# Patient Record
Sex: MALE | Race: WHITE | NOT HISPANIC OR LATINO | Employment: OTHER | URBAN - METROPOLITAN AREA
[De-identification: names, ages, dates, MRNs, and addresses within clinical notes are randomized per-mention and may not be internally consistent; named-entity substitution may affect disease eponyms.]

---

## 2023-10-09 ENCOUNTER — APPOINTMENT (OUTPATIENT)
Dept: CARDIOLOGY | Facility: HOSPITAL | Age: 63
End: 2023-10-09
Payer: COMMERCIAL

## 2023-10-09 ENCOUNTER — APPOINTMENT (OUTPATIENT)
Dept: MRI IMAGING | Facility: HOSPITAL | Age: 63
End: 2023-10-09
Payer: COMMERCIAL

## 2023-10-09 ENCOUNTER — HOSPITAL ENCOUNTER (EMERGENCY)
Facility: HOSPITAL | Age: 63
Discharge: HOME | End: 2023-10-09
Attending: EMERGENCY MEDICINE | Admitting: EMERGENCY MEDICINE
Payer: COMMERCIAL

## 2023-10-09 VITALS
HEIGHT: 69 IN | BODY MASS INDEX: 29.62 KG/M2 | HEART RATE: 84 BPM | SYSTOLIC BLOOD PRESSURE: 164 MMHG | OXYGEN SATURATION: 96 % | RESPIRATION RATE: 18 BRPM | TEMPERATURE: 98 F | DIASTOLIC BLOOD PRESSURE: 96 MMHG | WEIGHT: 200 LBS

## 2023-10-09 DIAGNOSIS — R20.0 NUMBNESS AND TINGLING OF LEFT LEG: Primary | ICD-10-CM

## 2023-10-09 DIAGNOSIS — R20.2 NUMBNESS AND TINGLING OF LEFT LEG: Primary | ICD-10-CM

## 2023-10-09 LAB
BH CV LOWER VASCULAR LEFT COMMON FEMORAL AUGMENT: NORMAL
BH CV LOWER VASCULAR LEFT COMMON FEMORAL COMPRESS: NORMAL
BH CV LOWER VASCULAR LEFT COMMON FEMORAL PHASIC: NORMAL
BH CV LOWER VASCULAR LEFT COMMON FEMORAL SPONT: NORMAL
BH CV LOWER VASCULAR LEFT DISTAL FEMORAL COMPRESS: NORMAL
BH CV LOWER VASCULAR LEFT GASTRONEMIUS COMPRESS: NORMAL
BH CV LOWER VASCULAR LEFT GREATER SAPH AK COMPRESS: NORMAL
BH CV LOWER VASCULAR LEFT GREATER SAPH BK COMPRESS: NORMAL
BH CV LOWER VASCULAR LEFT LESSER SAPH COMPRESS: NORMAL
BH CV LOWER VASCULAR LEFT MID FEMORAL AUGMENT: NORMAL
BH CV LOWER VASCULAR LEFT MID FEMORAL COMPRESS: NORMAL
BH CV LOWER VASCULAR LEFT MID FEMORAL PHASIC: NORMAL
BH CV LOWER VASCULAR LEFT MID FEMORAL SPONT: NORMAL
BH CV LOWER VASCULAR LEFT PERONEAL COMPRESS: NORMAL
BH CV LOWER VASCULAR LEFT POPLITEAL AUGMENT: NORMAL
BH CV LOWER VASCULAR LEFT POPLITEAL COMPRESS: NORMAL
BH CV LOWER VASCULAR LEFT POPLITEAL PHASIC: NORMAL
BH CV LOWER VASCULAR LEFT POPLITEAL SPONT: NORMAL
BH CV LOWER VASCULAR LEFT POSTERIOR TIBIAL COMPRESS: NORMAL
BH CV LOWER VASCULAR LEFT PROFUNDA FEMORAL COMPRESS: NORMAL
BH CV LOWER VASCULAR LEFT PROXIMAL FEMORAL COMPRESS: NORMAL
BH CV LOWER VASCULAR LEFT SAPHENOFEMORAL JUNCTION COMPRESS: NORMAL
BH CV LOWER VASCULAR RIGHT COMMON FEMORAL AUGMENT: NORMAL
BH CV LOWER VASCULAR RIGHT COMMON FEMORAL COMPRESS: NORMAL
BH CV LOWER VASCULAR RIGHT COMMON FEMORAL PHASIC: NORMAL
BH CV LOWER VASCULAR RIGHT COMMON FEMORAL SPONT: NORMAL
BH CV VAS PRELIMINARY FINDINGS SCRIPTING: 1
BUN BLDA-MCNC: 15 MG/DL (ref 8–26)
CA-I BLDA-SCNC: 1.18 MMOL/L (ref 1.2–1.32)
CHLORIDE BLDA-SCNC: 104 MMOL/L (ref 98–109)
CO2 BLDA-SCNC: 22 MMOL/L (ref 24–29)
CREAT BLDA-MCNC: 1.2 MG/DL (ref 0.6–1.3)
EGFRCR SERPLBLD CKD-EPI 2021: 68.8 ML/MIN/1.73
GLUCOSE BLDC GLUCOMTR-MCNC: 100 MG/DL (ref 70–130)
HCT VFR BLDA CALC: 47 % (ref 38–51)
HGB BLDA-MCNC: 16 G/DL (ref 12–17)
POTASSIUM BLDA-SCNC: 3.7 MMOL/L (ref 3.5–4.9)
SODIUM BLD-SCNC: 141 MMOL/L (ref 138–146)

## 2023-10-09 PROCEDURE — 99284 EMERGENCY DEPT VISIT MOD MDM: CPT

## 2023-10-09 PROCEDURE — 93971 EXTREMITY STUDY: CPT | Performed by: INTERNAL MEDICINE

## 2023-10-09 PROCEDURE — 85014 HEMATOCRIT: CPT

## 2023-10-09 PROCEDURE — 70551 MRI BRAIN STEM W/O DYE: CPT

## 2023-10-09 PROCEDURE — 93971 EXTREMITY STUDY: CPT

## 2023-10-09 PROCEDURE — 80047 BASIC METABLC PNL IONIZED CA: CPT

## 2023-10-10 NOTE — ED PROVIDER NOTES
"Subjective   History of Present Illness  63-year-old male presents for evaluation of \"possible blood clot.\"  Of note, the patient tells me that he became symptomatic 4 days ago.  At that time he began experiencing numbness and tingling to his left upper extremity.  His symptoms seemed to improve spontaneously.  However, the following day he began experiencing numbness and tingling to his left lower extremity.  He went to his local emergency department where work-up, including head CT, was negative and he was discharged home with outpatient follow-up.  He was referred for an outpatient brain MRI.  He has been unable to get it thus far.  Today, he began experiencing a sensation of pain and swelling to his left calf and was advised to come to the emergency department to be evaluated.  The patient denies any fevers.  He denies any trauma or injury.  He denies any weakness to his left arm or left leg.  No changes to his speech pattern.  As a result of his ongoing symptoms he came here for evaluation.      Review of Systems   Musculoskeletal:         Left calf pain   Neurological:  Positive for numbness.   All other systems reviewed and are negative.      No past medical history on file.    No Known Allergies    No past surgical history on file.    No family history on file.    Social History     Socioeconomic History    Marital status: Single           Objective   Physical Exam  Vitals and nursing note reviewed.   Constitutional:       General: He is not in acute distress.     Appearance: He is well-developed. He is not diaphoretic.      Comments: Nontoxic-appearing male   HENT:      Head: Normocephalic and atraumatic.   Neck:      Vascular: No JVD.   Cardiovascular:      Rate and Rhythm: Normal rate and regular rhythm.      Heart sounds: Normal heart sounds. No murmur heard.     No friction rub. No gallop.   Pulmonary:      Effort: Pulmonary effort is normal. No respiratory distress.      Breath sounds: Normal breath " "sounds. No wheezing or rales.   Abdominal:      General: Bowel sounds are normal. There is no distension.      Palpations: Abdomen is soft. There is no mass.      Tenderness: There is no abdominal tenderness. There is no guarding.   Musculoskeletal:         General: Normal range of motion.      Cervical back: Normal range of motion.      Right lower leg: No edema.      Left lower leg: No edema.   Skin:     General: Skin is warm and dry.      Coloration: Skin is not pale.      Findings: No erythema or rash.      Comments: No warmth or erythema noted to left calf   Neurological:      Mental Status: He is alert and oriented to person, place, and time.      Comments: Awake, alert, and oriented x3, clear and fluent speech, no ataxia or dysmetria, normal gait, neurovascularly intact distally in all fours with bounding distal pulses and normal sensation, 5 out of 5 strength in all fours, no cranial nerve deficits noted with cranial nerves II through XII grossly intact   Psychiatric:         Thought Content: Thought content normal.         Judgment: Judgment normal.         Procedures           ED Course  ED Course as of 10/10/23 0208   Tue Oct 10, 2023   0205 63-year-old male sent to the emergency department to be evaluated for \"possible DVT of left calf\" and numbness and tingling to left arm and left leg.  On arrival to the ED, the patient is nontoxic-appearing.  Benign exam.  No left calf swelling or tenderness noted.  No exam or historical findings to suggest infectious etiology of the patient's symptoms.  He has no focal neurological deficits noted at this time from an objective standpoint.  However, given his reported symptoms, I do feel that neuroimaging is indicated to rule out emergent central process. [DD]   0206 Doppler ultrasound is negative for DVT.  Electrolytes are normal. [DD]   0206 I personally and independently reviewed the patient's MRI images and findings, and I am in agreement with the radiologist " regarding MRI interpretation--particularly, there is no emergent central process present. [DD]   0207 Patient reassured and counseled regarding symptomatic management of his symptoms.  I have referred him to Dr. Nation of neurology and he will follow-up as needed.  Agreeable with plan and given appropriate strict return precautions.  Doubt emergent central process at this time. [DD]   0207 No abdominal pain.  No chest pain.  No focal weakness.  No pulse deficits noted.  Clinical presentation does not appear to be consistent with aortic dissection. [DD]      ED Course User Index  [DD] Joss Higgins MD                                 Recent Results (from the past 24 hour(s))   Duplex Venous Lower Extremity - LEFT    Collection Time: 10/09/23  6:57 PM   Result Value Ref Range    Right Common Femoral Spont Y     Right Common Femoral Phasic Y     Right Common Femoral Compress C     Right Common Femoral Augment Y     Left Common Femoral Spont Y     Left Common Femoral Phasic Y     Left Common Femoral Compress C     Left Common Femoral Augment Y     Left Saphenofemoral Junction Compress C     Left Profunda Femoral Compress C     Left Proximal Femoral Compress C     Left Mid Femoral Spont Y     Left Mid Femoral Phasic Y     Left Mid Femoral Compress C     Left Mid Femoral Augment Y     Left Distal Femoral Compress C     Left Popliteal Spont Y     Left Popliteal Phasic Y     Left Popliteal Compress C     Left Popliteal Augment Y     Left Posterior Tibial Compress C     Left Peroneal Compress C     Left Gastronemius Compress C     Left Greater Saph AK Compress C     Left Greater Saph BK Compress C     Left Lesser Saph Compress C     BH CV VAS PRELIMINARY FINDINGS SCRIPTING 1.0    POC CHEM 8    Collection Time: 10/09/23  7:18 PM    Specimen: Blood   Result Value Ref Range    Glucose 100 70 - 130 mg/dL    BUN 15 8 - 26 mg/dL    Creatinine 1.20 0.60 - 1.30 mg/dL    Sodium 141 138 - 146 mmol/L    POC Potassium 3.7 3.5 - 4.9  "mmol/L    Chloride 104 98 - 109 mmol/L    Total CO2 22 (L) 24 - 29 mmol/L    Hemoglobin 16.0 12.0 - 17.0 g/dL    Hematocrit 47 38 - 51 %    Ionized Calcium 1.18 (L) 1.20 - 1.32 mmol/L    eGFR 68.8 >60.0 mL/min/1.73     Note: In addition to lab results from this visit, the labs listed above may include labs taken at another facility or during a different encounter within the last 24 hours. Please correlate lab times with ED admission and discharge times for further clarification of the services performed during this visit.    MRI Brain Without Contrast   Final Result   Impression:      1. Mild generalized parenchymal volume loss and changes of chronic small vessel ischemic disease.   2. No acute intracranial abnormality.            Electronically Signed: Ramiro Shepherd MD     10/9/2023 6:14 PM EDT     Workstation ID: [id number]        Vitals:    10/09/23 1715   BP: 164/96   BP Location: Left arm   Patient Position: Sitting   Pulse: 84   Resp: 18   Temp: 98 øF (36.7 øC)   TempSrc: Oral   SpO2: 96%   Weight: 90.7 kg (200 lb)   Height: 175.3 cm (69\")     Medications - No data to display  ECG/EMG Results (last 24 hours)       ** No results found for the last 24 hours. **          No orders to display                 Medical Decision Making  Problems Addressed:  Numbness and tingling of left leg: complicated acute illness or injury    Amount and/or Complexity of Data Reviewed  Labs: ordered.  Radiology: ordered.        Final diagnoses:   Numbness and tingling of left leg       ED Disposition  ED Disposition       ED Disposition   Discharge    Condition   Stable    Comment   --               Caroline Nation MD  [address] [address] [phone]    In 1 week           Medication List      No changes were made to your prescriptions during this visit.            Joss Higgins MD  10/10/23 0208    "

## 2023-10-13 ENCOUNTER — TELEPHONE (OUTPATIENT)
Dept: NEUROLOGY | Facility: CLINIC | Age: 63
End: 2023-10-13
Payer: COMMERCIAL

## 2023-10-13 NOTE — TELEPHONE ENCOUNTER
Caller: José Raza    Relationship to patient: Self    Best call back number: [phone]     New or established patient?  [x] New  [] Established    Date of discharge: 10/9/23    Facility discharged from:     Diagnosis/Symptoms: NUMBNESS & TINGLING OF LEFT LEG     Length of stay (If applicable): 1      Specialty Only: Did you see a [facility] provider?    [] Yes  [x] No  If so, who? N/A      PATIENT TELEPHONED TO REQUEST [facility] HOSP F/U W/ [facility] FOR DX:NUMBNESS & TINGLING OF LEFT LEG     NO PREV. NEURO AT THIS TIME    OK TO SCHEDULE? IF SO, WITH WHICH PROVIDER? PLEASE REVIEW & ADVISE-THANK YOU

## 2023-10-17 ENCOUNTER — OFFICE VISIT (OUTPATIENT)
Dept: NEUROLOGY | Facility: CLINIC | Age: 63
End: 2023-10-17
Payer: COMMERCIAL

## 2023-10-17 ENCOUNTER — LAB (OUTPATIENT)
Dept: LAB | Facility: HOSPITAL | Age: 63
End: 2023-10-17
Payer: COMMERCIAL

## 2023-10-17 VITALS
HEART RATE: 73 BPM | OXYGEN SATURATION: 96 % | DIASTOLIC BLOOD PRESSURE: 78 MMHG | BODY MASS INDEX: 29.62 KG/M2 | SYSTOLIC BLOOD PRESSURE: 140 MMHG | HEIGHT: 69 IN | WEIGHT: 200 LBS

## 2023-10-17 DIAGNOSIS — R20.2 LEFT LEG PARESTHESIAS: Primary | ICD-10-CM

## 2023-10-17 DIAGNOSIS — R20.2 LEFT LEG PARESTHESIAS: ICD-10-CM

## 2023-10-17 DIAGNOSIS — R20.2 PARESTHESIAS IN LEFT HAND: ICD-10-CM

## 2023-10-17 DIAGNOSIS — Z87.898 HISTORY OF PREDIABETES: ICD-10-CM

## 2023-10-17 PROCEDURE — 82607 VITAMIN B-12: CPT

## 2023-10-17 PROCEDURE — 36415 COLL VENOUS BLD VENIPUNCTURE: CPT

## 2023-10-17 PROCEDURE — 83036 HEMOGLOBIN GLYCOSYLATED A1C: CPT

## 2023-10-17 PROCEDURE — 82746 ASSAY OF FOLIC ACID SERUM: CPT

## 2023-10-17 PROCEDURE — 83921 ORGANIC ACID SINGLE QUANT: CPT

## 2023-10-17 RX ORDER — ATORVASTATIN CALCIUM 10 MG/1
TABLET, FILM COATED ORAL DAILY
COMMUNITY

## 2023-10-17 RX ORDER — OMEPRAZOLE 20 MG/1
20 CAPSULE, DELAYED RELEASE ORAL DAILY
COMMUNITY

## 2023-10-17 NOTE — PROGRESS NOTES
Neuro Office Visit      Encounter Date: 10/17/2023   Patient Name: José Raza  : 1960   MRN: [id number]   PCP:  Wesley Mayfield MD     Chief Complaint:    Chief Complaint   Patient presents with    Numbness       History of Present Illness: José Raza is a 63 y.o. male who is here today in Neurology for ED follow up.    PMH of ocular migraines (looking through running water), cardiac ablation for SVT over 10 years ago, HLD, occasional acid reflux     Patient was seen at [facility] ED on 10/9/2023 for evaluation of possible blood clot.  He reported at that time that he became symptomatic 4 days prior.  At that time he began experiencing numbness and tingling to his left upper extremity.  Symptoms seem to improve spontaneously.  However the following day he began experiencing numbness and tingling to his left lower extremity.  He went to his local emergency department where work-up including head CT was negative and he was discharged home with outpatient follow-up.  He was referred for an outpatient brain MRI which had not been completed yet.  On 10/9/2023 he began experiencing a sensation of pain and swelling to his left calf and was advised to come the emergency department to be evaluated.  He denied any weakness of left arm or left leg.  No changes to speech pattern.  While in the ED he he underwent Doppler ultrasound which was negative for DVT.  He also underwent MRI brain without contrast which showed mild generalized parenchymal volume loss and changes of chronic small vessel ischemic disease, no acute intracranial abnormality.  He was discharged from ED in stable condition with plan to be evaluated by neurology in 1 week.    In clinic today he reports left arm and left leg numbness. He reports that he does go to the chiropractor for chronic neck pain. The pain went away in his left calf, but the numbness has persisted in his left leg from the waist down to his toes, he feels  "like he is walking on a foreign object. No urinary or bowel incontinence, no saddle anesthesia. He reports that his left arm has a slight bit of numbness in his fingertips and that he does have history of numbness in bilateral arms previously. Denies any persistent low back pain. No weakness or arms or legs. No history of diabetes or heavy alcohol use.      Subjective      Review of Systems   Constitutional: Negative.    HENT: Negative.     Respiratory: Negative.     Cardiovascular: Negative.    Gastrointestinal: Negative.    Endocrine: Negative.    Genitourinary: Negative.    Musculoskeletal: Negative.    Skin: Negative.    Allergic/Immunologic: Negative.    Neurological:  Positive for numbness.   Hematological: Negative.    Psychiatric/Behavioral: Negative.            Past Medical History: History reviewed. No pertinent past medical history.    Past Surgical History: History reviewed. No pertinent surgical history.    Family History: History reviewed. No pertinent family history.    Social History:   Social History     Socioeconomic History    Marital status: Single   Tobacco Use    Smoking status: Never     Passive exposure: Never    Smokeless tobacco: Never   Vaping Use    Vaping Use: Never used       Medications:     Current Outpatient Medications:     atorvastatin (LIPITOR) 5 MG half tablet, Take  by mouth Daily. Unsure of mg, Disp: , Rfl:     omeprazole (priLOSEC) 20 MG capsule, Take 1 capsule by mouth Daily., Disp: , Rfl:     Allergies:   No Known Allergies      STEADI Fall Risk Assessment has not been completed.    Objective     Objective:    /78   Pulse 73   Ht 175.3 cm (69\")   Wt 90.7 kg (200 lb)   SpO2 96%   BMI 29.53 kg/m²   Body mass index is 29.53 kg/m².    Physical Exam  Vitals reviewed.   Constitutional:       Appearance: Normal appearance.   HENT:      Head: Normocephalic and atraumatic.      Mouth/Throat:      Mouth: Mucous membranes are moist.      Pharynx: Oropharynx is clear. "   Pulmonary:      Effort: Pulmonary effort is normal. No respiratory distress.   Musculoskeletal:      Right lower leg: No edema.      Left lower leg: No edema.   Skin:     General: Skin is warm and dry.   Neurological:      Mental Status: He is alert.          Neurology Exam:    General apperance: NAD.     Mental status: Alert, awake and oriented to time place and person.    Fund of knowledge:  Normal.     Language and Speech: No aphasia or dysarthria.    Naming , Repitition and Comprehension:  Can name objects, repeat a sentence and follow commands. Speech is clear and fluent with good repetition, comprehension, and naming.    Cranial Nerves:   CN II: Visual fields are full. Intact. Pupils - PERRLA  CN III, IV and VI: Extraocular movements are intact. Normal saccades.   CN V: Facial sensation is intact.   CN VII: Muscles of facial expression reveal no asymmetry. Intact.   CN VIII: Hearing is intact.   CN IX and X: Palate elevates symmetrically. Intact  CN XI: Shoulder shrug is intact.   CN XII: Tongue is midline without evidence of atrophy or fasciculation.     Motor:  Right UE muscle strength 5/5. Normal tone.     Left UE muscle strength 5/5. Normal tone.      Right LE muscle strength 5/5. Normal tone.     Left LE muscle strength 5/5. Normal tone.      Sensory: Decreased sensation left hand fingertips and entire left leg from hip to feet. Vibratory sense intact. Proprioception intact.     DTRs: 2+ bilaterally in upper and lower extremities.    Coordination: Normal finger-to-nose, heel to shin B/L.    Rhomberg: Negative.    Gait: Normal.        Results:   Imaging:   MRI Brain Without Contrast    Result Date: 10/9/2023  Impression: 1. Mild generalized parenchymal volume loss and changes of chronic small vessel ischemic disease. 2. No acute intracranial abnormality. Electronically Signed: Ramiro Shepherd MD  10/9/2023 6:14 PM EDT  Workstation ID: [id number]       Labs:       Assessment / Plan      Assessment/Plan:    Diagnoses and all orders for this visit:    1. Left leg paresthesias (Primary)  -     Hemoglobin A1c; Future  -     Vitamin B12 & Folate; Future  -     Methylmalonic Acid, Serum; Future  -     EMG & Nerve Conduction Test; Future  -     Cancel: MRI Lumbar Spine Without Contrast; Future  -     MRI Lumbar Spine Without Contrast; Future    2. Paresthesias in left hand  -     Hemoglobin A1c; Future  -     Vitamin B12 & Folate; Future  -     Methylmalonic Acid, Serum; Future  -     EMG & Nerve Conduction Test; Future    3. History of prediabetes  -     Hemoglobin A1c; Future  -     EMG & Nerve Conduction Test; Future       José Raza is in neurology clinic to establish care for left hand and left leg paresthesias. He reports that his most bothersome is the sudden onset of left leg numbness. MRI brain performed in ED was without acute intracranial abnormality. I have reviewed his case with Dr. Shaw today and we have elected to pursue EMG and lab work. I have also ordered urgent MRI lumbar spine as he reports that his whole left leg is numb and this came on quite suddenly - no loss of bowel or bladder control or weakness however given the acute nature of his sensory change I would recommend pursuing MRI lumbar spine.     Addendum 10/20/2023:  Dermatomal involvement includes L2, L3, L4, L5, S1 of left leg to light touch    Patient Education:       Reviewed medications, potential side effects and signs and symptoms to report. Discussed risk versus benefits of treatment plan with patient and/or family-including medications, labs and radiology that may be ordered. Addressed questions and concerns during visit. Patient and/or family verbalized understanding and agree with plan. Instructed to call the office with any questions and report to ER with any life-threatening symptoms.     Follow Up:   Return in about 4 weeks (around 11/14/2023).    I spent 30  minutes face to face with the patient. I personally spent 50 percent  of this time counseling and discussing diagnostic testing, evaluation, treatment options, and management .       During this visit the following were done:  Labs Reviewed []    Labs Ordered [x]    Radiology Reports Reviewed [x]    Radiology Ordered [x]    PCP Records Reviewed []    Referring Provider Records Reviewed []    ER Records Reviewed []    Hospital Records Reviewed [x]    History Obtained From Family []    Radiology Images Reviewed [x]    Other Reviewed []    Records Requested []      MOMO Rollins  [facility]  [address] [address] [phone]

## 2023-10-18 LAB
FOLATE SERPL-MCNC: 12.5 NG/ML (ref 4.78–24.2)
HBA1C MFR BLD: 6.2 % (ref 4.8–5.6)
VIT B12 BLD-MCNC: 338 PG/ML (ref 211–946)

## 2023-10-19 ENCOUNTER — TELEPHONE (OUTPATIENT)
Dept: NEUROLOGY | Facility: CLINIC | Age: 63
End: 2023-10-19
Payer: COMMERCIAL

## 2023-10-19 NOTE — TELEPHONE ENCOUNTER
Notified patient of results and new medication.  Stated he was currently taking b complex vitamin which I told him to stop that for now per Leyla and just do the b12 injections.    He stated that he has been taking zinc 50mg daily for awhile now (he opted out of covid vaccines and has been taking supplements instead) and became aware that a long term side effect of too much zinc is copper deficiency which may cause numbness.  He wanted Leyla to know in case she wanted to get his zinc checked.    Notified of MRI and he stated he has high deductible and since it's close to end of year that if we may expedite his test before end of year he would appreciate it.

## 2023-10-19 NOTE — TELEPHONE ENCOUNTER
----- Message from MOMO Rollins sent at 10/18/2023  4:22 PM EDT -----  Please notify José that his hemoglobin A1C level was slightly elevated at 6.20 which indicates prediabetes, I would recommend eating a heart healthy diet and minimizing intake of concentrated sweets. His vitamin b-12 level was 338 which is on the low end of normal for which I am going to send B12 injections to his pharmacy, he is to do 2 injections in the first week (about every 3 days), then weekly for a month, and then monthly for 6 months. If he is not comfortable doing the injections he can ask that they are done at the pharmacy. I have also ordered MRI lumbar spine to check for any spinal cord involvement/nerve impingement that may be causing his numbness

## 2023-10-20 ENCOUNTER — TELEPHONE (OUTPATIENT)
Dept: NEUROLOGY | Facility: CLINIC | Age: 63
End: 2023-10-20
Payer: COMMERCIAL

## 2023-10-20 DIAGNOSIS — R20.2 LEFT LEG PARESTHESIAS: Primary | ICD-10-CM

## 2023-10-20 NOTE — TELEPHONE ENCOUNTER
Provider: TOBY ARRIAGA APRN    Caller: VANESSA    Relationship to Patient: SELF    Phone Number: [phone]    Reason for Call: PT CALLED TO LET PROVIDER KNOW HE WENT TO [facility] AND GOT HIS B 12 MEDICATION AND NO SYRINGES.   [facility] TOLD HIM THEY NEED A PRESCRIPTION FOR THE SYRINGES.   [facility] DOES NOT DO THE INJECTIONS.  PT WANTS TO KNOW WHO IS GOING TO SHOW HIM HOW TO DO THE INJECTIONS.    When was the patient last seen: 10-17-23    PLEASE CALL & ADVISE

## 2023-10-20 NOTE — TELEPHONE ENCOUNTER
Notified patient of script being sent in.  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  I informed him to make sure that he uses a separate needle/syringe with each injection, to clean with alcohol swab prior to injection, to make sure to draw up the correct amount,  and to follow the directions exactly.  He stated that he was in good understanding.

## 2023-10-20 NOTE — TELEPHONE ENCOUNTER
Spoke to pharmacy and they stated that they do not have the kits (they just gave him the vials) and will need script for the syringes.  They advised that he buy them online as typically insurance does not cover syringes and it will be cheaper.

## 2023-10-20 NOTE — TELEPHONE ENCOUNTER
Patient called stating that he still needs the syringe and needles for his injections. He called his PCP about it and they said they could order them for him but he is afraid they will take too long in getting them ordered.  I told him I would ask Leyla if she can call him in a script for it.

## 2023-10-20 NOTE — TELEPHONE ENCOUNTER
Spoke w/Jayme and she stated that he may walk-in from 9-11 & 2-4 M-F for them to show him how to do the injection or they can give it to him.    Notified José that his PCP can give injections or show him how to do it.  He was in good understanding.

## 2023-10-20 NOTE — TELEPHONE ENCOUNTER
----- Message from MOMO Rollins sent at 10/20/2023  3:26 PM EDT -----  I have placed order for needles and syringes to be dispensed by pharmacy, please ask that these injections are done by primary care office with use of an alcohol swab prior to injection. Please let me know if he has any additional questions or concerns, thank you!

## 2023-10-22 LAB — METHYLMALONATE SERPL-SCNC: 178 NMOL/L (ref 0–378)

## 2023-10-23 ENCOUNTER — TELEPHONE (OUTPATIENT)
Dept: NEUROLOGY | Facility: CLINIC | Age: 63
End: 2023-10-23
Payer: COMMERCIAL

## 2023-10-23 NOTE — TELEPHONE ENCOUNTER
----- Message from MOMO Rollins sent at 10/23/2023 11:06 AM EDT -----  Methylmalonic acid level is normal.

## 2023-10-23 NOTE — TELEPHONE ENCOUNTER
"No answer.    Relay     \"Per Leyla Overton, your methylmalonic acid level is normal.\"                "

## 2023-10-24 ENCOUNTER — HOSPITAL ENCOUNTER (OUTPATIENT)
Dept: MRI IMAGING | Facility: HOSPITAL | Age: 63
Discharge: HOME | End: 2023-10-24
Payer: COMMERCIAL

## 2023-10-24 DIAGNOSIS — R20.2 LEFT LEG PARESTHESIAS: ICD-10-CM

## 2023-10-24 PROCEDURE — 72148 MRI LUMBAR SPINE W/O DYE: CPT

## 2023-10-25 ENCOUNTER — TELEPHONE (OUTPATIENT)
Dept: NEUROLOGY | Facility: CLINIC | Age: 63
End: 2023-10-25
Payer: COMMERCIAL

## 2023-10-25 DIAGNOSIS — M51.26 PROTRUSION OF LUMBAR INTERVERTEBRAL DISC: Primary | ICD-10-CM

## 2023-10-25 NOTE — TELEPHONE ENCOUNTER
Notified patient of results.  He is interested in PT in [place].    Also, he wanted to know if that is where the numbness is coming from?

## 2023-10-25 NOTE — TELEPHONE ENCOUNTER
----- Message from MOMO Rollins sent at 10/24/2023  4:55 PM EDT -----  Please notify José that his MRI lumbar spine shows Only finding I see is a central disc protrusion at L2-L3 without any evidence of neuroforaminal stenosis at this level, there are no severe areas of narrowing, Dr. Shaw recommended trying PT, would be interested in PT? If yes I can place order, thanks.

## 2023-10-26 NOTE — TELEPHONE ENCOUNTER
Notified patient and let him know that he can discuss more in detail with Leyla at his OV 11/17/23 @8:30.  He was in good understanding.

## 2023-11-07 ENCOUNTER — HOSPITAL ENCOUNTER (OUTPATIENT)
Dept: NEUROLOGY | Facility: HOSPITAL | Age: 63
Discharge: HOME | End: 2023-11-07
Payer: COMMERCIAL

## 2023-11-07 DIAGNOSIS — Z87.898 HISTORY OF PREDIABETES: ICD-10-CM

## 2023-11-07 DIAGNOSIS — R20.2 PARESTHESIAS IN LEFT HAND: ICD-10-CM

## 2023-11-07 DIAGNOSIS — R20.2 LEFT LEG PARESTHESIAS: ICD-10-CM

## 2023-11-07 PROCEDURE — 95912 NRV CNDJ TEST 11-12 STUDIES: CPT

## 2023-11-07 PROCEDURE — 95886 MUSC TEST DONE W/N TEST COMP: CPT

## 2023-11-08 ENCOUNTER — TELEPHONE (OUTPATIENT)
Dept: NEUROLOGY | Facility: CLINIC | Age: 63
End: 2023-11-08
Payer: COMMERCIAL

## 2023-11-08 DIAGNOSIS — M51.26 PROTRUSION OF LUMBAR INTERVERTEBRAL DISC: ICD-10-CM

## 2023-11-08 DIAGNOSIS — R20.2 LEFT LEG PARESTHESIAS: Primary | ICD-10-CM

## 2023-11-08 NOTE — TELEPHONE ENCOUNTER
----- Message from MOMO Rollins sent at 11/7/2023  3:19 PM EST -----  Please notify José that his EMG shows mild-moderate ulnar neuropathy of his left elbow - I would recommend wearing soft elbow brace at night to minimize ulnar nerve compression. Otherwise EMG was normal. Please let me know if he has any questions or concerns, thank you!

## 2023-11-08 NOTE — TELEPHONE ENCOUNTER
Notified patient of results.  His main concern is the numbness in his leg.  He stated that it is pretty severe and starts from his hip and goes down to his feet and toes.  Does the EMG show anything regarding that?  Please advise.

## 2023-11-09 NOTE — TELEPHONE ENCOUNTER
Called José with Leyla's response and he states no, he has not seen improvement with the B12 injections. He actually feels the numbness in his left leg and foot have gotten worse since seeing you and after the EMG.

## 2023-11-13 RX ORDER — PREGABALIN 50 MG/1
50 CAPSULE ORAL NIGHTLY
Qty: 30 CAPSULE | Refills: 2 | Status: SHIPPED | OUTPATIENT
Start: 2023-11-13 | End: 2024-02-11

## 2023-11-13 NOTE — TELEPHONE ENCOUNTER
Notified patient that script has been sent in and that it may cause sleepiness and he was in good understanding.

## 2023-11-13 NOTE — TELEPHONE ENCOUNTER
Spoke with José and he is willing to try Lyrica.   I informed him that at his next appointment we will have him sign a CSA.  I briefly went over what the agreement entails (that he will not abuse medication, he will take it as prescribed).  He would like it sent to [facility] pharmacy in [place].

## 2023-11-13 NOTE — TELEPHONE ENCOUNTER
Thank you, CLARA reviewed. I have sent Lyrica 50 mg nightly, this medication can cause some sleepiness. I also recommend not taking with any alcohol or other sedating medications. Please let me know if he has any questions or concerns prior to his next visit, thanks.

## 2023-11-17 ENCOUNTER — OFFICE VISIT (OUTPATIENT)
Dept: NEUROLOGY | Facility: CLINIC | Age: 63
End: 2023-11-17
Payer: COMMERCIAL

## 2023-11-17 VITALS
DIASTOLIC BLOOD PRESSURE: 84 MMHG | OXYGEN SATURATION: 95 % | SYSTOLIC BLOOD PRESSURE: 140 MMHG | HEART RATE: 67 BPM | HEIGHT: 69 IN | WEIGHT: 200 LBS | BODY MASS INDEX: 29.62 KG/M2

## 2023-11-17 DIAGNOSIS — R20.2 LEFT LEG PARESTHESIAS: Primary | ICD-10-CM

## 2023-11-17 DIAGNOSIS — G56.22 ULNAR NEUROPATHY AT ELBOW, LEFT: ICD-10-CM

## 2023-11-17 DIAGNOSIS — R90.82 WHITE MATTER ABNORMALITY ON MRI OF BRAIN: ICD-10-CM

## 2023-11-17 DIAGNOSIS — M51.26 PROTRUSION OF LUMBAR INTERVERTEBRAL DISC: ICD-10-CM

## 2023-11-17 NOTE — PROGRESS NOTES
Neuro Office Visit      Encounter Date: 2023   Patient Name: José Raza  : 1960   MRN: [id number]   PCP:  Wesley Mayfield MD     Chief Complaint:    Chief Complaint   Patient presents with    Numbness     Left leg       History of Present Illness: José Raza is a 63 y.o. male who is here today in Neurology for  left leg numbness    Initial visit 10/17/2023:  José Raza is a 63 y.o. male who is here today in Neurology for ED follow up.     PMH of ocular migraines (looking through running water), cardiac ablation for SVT over 10 years ago, HLD, occasional acid reflux      Patient was seen at [facility] ED on 10/9/2023 for evaluation of possible blood clot.  He reported at that time that he became symptomatic 4 days prior.  At that time he began experiencing numbness and tingling to his left upper extremity.  Symptoms seem to improve spontaneously.  However the following day he began experiencing numbness and tingling to his left lower extremity.  He went to his local emergency department where work-up including head CT was negative and he was discharged home with outpatient follow-up.  He was referred for an outpatient brain MRI which had not been completed yet.  On 10/9/2023 he began experiencing a sensation of pain and swelling to his left calf and was advised to come the emergency department to be evaluated.  He denied any weakness of left arm or left leg.  No changes to speech pattern.  While in the ED he he underwent Doppler ultrasound which was negative for DVT.  He also underwent MRI brain without contrast which showed mild generalized parenchymal volume loss and changes of chronic small vessel ischemic disease, no acute intracranial abnormality.  He was discharged from ED in stable condition with plan to be evaluated by neurology in 1 week.     In clinic today he reports left arm and left leg numbness. He reports that he does go to the chiropractor for chronic  neck pain. The pain went away in his left calf, but the numbness has persisted in his left leg from the waist down to his toes, he feels like he is walking on a foreign object. No urinary or bowel incontinence, no saddle anesthesia. He reports that his left arm has a slight bit of numbness in his fingertips and that he does have history of numbness in bilateral arms previously. Denies any persistent low back pain. No weakness or arms or legs. No history of diabetes or heavy alcohol use.      Current visit 11/17/2023:  José Raza returns to neurology clinic for continued evaluation of left leg paresthesias. MRI brain performed on 10/9/2023 negative for any acute changes, there is mild generalized parenchymal volume loss. There are scattered foci of increased T2 signal within the subcortical and periventricular white matter bilaterally. MRI lumbar spine performed on 10/24/2023 showed central posterior disc extrusion at the L2/3 level with minimal central and lateral recess narrowing. Mild to moderate foraminal stenosis at this level. Mild multifactorial degenerative changes of the lumbar spine otherwise.This was reviewed with Dr. Shaw as well. 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 Hemoglobin A1c 6.20 - prediabetes, vitamin b12 was 338 for which he was started on B12 injections, methylmalonic acid was 178. His case was reviewed with Dr. Shaw and he was started on Lyrica 50 mg daily.     He notes continued numbness that ebbs and flows in his left leg, in his toes the numbness can sometimes shift between toes, whole leg continues to feel numb from hip down to toes. He notes tingling when he walks. He also feels like there is pressure in his leg. He does feel that the left leg is heavier than the right.  "He has been taking Lyrica 50 mg nightly without much change - does feel that it has helped some with the aching. No true left leg weakness but he does feel that it is heavier. He denies any next day grogginess from the Lyrica. He also notes intermittent left arm numbness which is much less bothersome/persistent than his left leg numbness.     For the last 3 years he has taken Zinc 50 mg daily.    Subjective      Review of Systems   Constitutional: Negative.    HENT: Negative.     Eyes: Negative.    Respiratory: Negative.     Cardiovascular: Negative.    Gastrointestinal: Negative.    Endocrine: Negative.    Genitourinary: Negative.    Musculoskeletal:  Positive for neck pain and neck stiffness.   Skin: Negative.    Allergic/Immunologic: Negative.    Neurological:  Positive for numbness.   Hematological: Negative.    Psychiatric/Behavioral: Negative.            Past Medical History: No past medical history on file.    Past Surgical History: No past surgical history on file.    Family History: No family history on file.    Social History:   Social History     Socioeconomic History    Marital status: Single   Tobacco Use    Smoking status: Never     Passive exposure: Never    Smokeless tobacco: Never   Vaping Use    Vaping Use: Never used       Medications:     Current Outpatient Medications:     atorvastatin (LIPITOR) 5 MG half tablet, Take  by mouth Daily. Unsure of mg, Disp: , Rfl:     Cyanocobalamin 1000 MCG/ML kit, Inject 1 mL as directed Every 3 (Three) Days for 7 days, THEN 1 mL 1 (One) Time Per Week for 28 days, THEN 1 mL Every 30 (Thirty) Days for 180 days., Disp: 12 kit, Rfl: 0    Needle, Disp, 23G X 1\" misc, To be used for B12 injections - B12 injections to be done by primary care office. Please also dispense 12 of the 1 mL syringes., Disp: 12 each, Rfl: 0    omeprazole (priLOSEC) 20 MG capsule, Take 1 capsule by mouth Daily., Disp: , Rfl:     pregabalin (Lyrica) 50 MG capsule, Take 1 capsule by mouth Every " "Night for 90 days., Disp: 30 capsule, Rfl: 2    Allergies:   No Known Allergies      Objective     Objective:    /84   Pulse 67   Ht 175.3 cm (69\")   Wt 90.7 kg (200 lb)   SpO2 95%   BMI 29.53 kg/m²   Body mass index is 29.53 kg/m².    Physical Exam  Vitals reviewed.   Constitutional:       Appearance: Normal appearance.   HENT:      Head: Normocephalic and atraumatic.      Mouth/Throat:      Mouth: Mucous membranes are moist.      Pharynx: Oropharynx is clear.   Pulmonary:      Effort: Pulmonary effort is normal. No respiratory distress.   Musculoskeletal:      Right lower leg: No edema.      Left lower leg: No edema.   Skin:     General: Skin is warm and dry.   Neurological:      Mental Status: He is alert.          Neurology Exam:    General apperance: NAD.     Mental status: Alert, awake and oriented to time place and person.    Fund of knowledge:  Normal.     Language and Speech: No aphasia or dysarthria.    Naming , Repitition and Comprehension:  Can name objects, repeat a sentence and follow commands. Speech is clear and fluent with good repetition, comprehension, and naming.    Cranial Nerves:   CN II: Visual fields are full. Intact. Pupils - PERRLA  CN III, IV and VI: Extraocular movements are intact. Normal saccades.   CN V: Facial sensation is intact.   CN VII: Muscles of facial expression reveal no asymmetry. Intact.   CN VIII: Hearing is intact.   CN IX and X: Palate elevates symmetrically. Intact  CN XI: Shoulder shrug is intact.   CN XII: Tongue is midline without evidence of atrophy or fasciculation.     Motor:  Right UE muscle strength 5/5. Normal tone.     Left UE muscle strength 5/5. Normal tone.      Right LE muscle strength 5/5. Normal tone.     Left LE muscle strength 5/5. Normal tone.      Sensory: Decreased sensation to light touch left leg from hip down to foot.    DTRs: 2+ bilaterally in upper and lower extremities.    Babinski: Negative bilaterally.    Coordination: Normal " finger-to-nose, heel to reza B/L.    Rhomberg: Negative.    Gait: Normal. No assistive device.    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     MRI Lumbar Spine Without Contrast    Result Date: 10/24/2023  Impression: 1.Central posterior disc extrusion at the L2/3 level with minimal central and lateral recess narrowing. Mild to moderate foraminal stenosis at this level. 2.Mild multifactorial degenerative changes of the lumbar spine otherwise. Electronically Signed: Alejandro Thomas MD  10/24/2023 9:05 AM CDT  Workstation ID: [id number]    MRI Brain Without Contrast    Result Date: 10/9/2023  Impression: 1. Mild generalized parenchymal volume loss and changes of chronic small vessel ischemic disease. 2. No acute intracranial abnormality. Electronically Signed: Ramiro Shepherd MD  10/9/2023 6:14 PM EDT  Workstation ID: [id number]       Labs:   Lab Results   Component Value Date    CREATININE 1.20 10/09/2023    EGFR 68.8 10/09/2023     Hemoglobin   Date Value Ref Range Status   10/09/2023 16.0 12.0 - 17.0 g/dL Final     Comment:     Serial Number: [id number]:  [id number]     Hematocrit   Date Value Ref Range Status   10/09/2023 47 38 - 51 % Final         Assessment / Plan      Assessment/Plan:   Diagnoses and all orders for this visit:    1. Left leg paresthesias (Primary)    2. Protrusion of lumbar intervertebral disc    3. White matter abnormality on MRI of brain    4. Ulnar neuropathy at elbow, left       José Raza is in neurology clinic to follow up for persistent left leg numbness. MRI brain performed on 10/9/2023 negative for any acute changes, there is mild generalized parenchymal volume  loss. There are scattered foci of increased T2 signal within the subcortical and periventricular white matter bilaterally. MRI lumbar spine performed on 10/24/2023 showed central posterior disc extrusion at the L2/3 level with minimal central and lateral recess narrowing. Mild to moderate foraminal stenosis at this level. Mild multifactorial degenerative changes of the lumbar spine otherwise.This was reviewed with Dr. Shaw as well. 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 Hemoglobin A1c 6.20 - prediabetes, vitamin b12 was 338 for which he was started on B12 injections, methylmalonic acid was 178. His case was reviewed with Dr. Shaw and he was started on Lyrica 50 mg daily which he feels has helped some with left leg aching but has not had much impact on numbness. He was examined alongside Dr. Shaw today and we discussed that he does have some white matter changes in the brain that aren't well explained, as he is overall healthy with minimal vascular risk factors - nonsmoker, prediabetes, takes low dose Atorvastatin 5 mg daily. We discussed that the next step to consider is a lumbar puncture with MS profile - he would like to try PT first/continue Lyrica 50 mg (he did not wish to increase dose today) and see how he does - will plan to see him back in clinic in about 6 weeks, I have advised him that if numbness/heaviness continues to progress in the meantime to please contact clinic and orders can be placed prior to his next visit. I did provide written material today regarding LP and MS as well.         Patient Education:       Reviewed medications, potential side effects and signs and symptoms to report. Discussed risk versus benefits of treatment plan  with patient and/or family-including medications, labs and radiology that may be ordered. Addressed questions and concerns during visit. Patient and/or family verbalized understanding and agree with plan. Instructed to call the office with any questions and report to ER with any life-threatening symptoms.     Follow Up:   Return in about 6 weeks (around 12/29/2023).    I spent 45 minutes face to face with the patient. I personally spent 50 percent of this time counseling and discussing diagnostic testing, evaluation, treatment options, and management .       During this visit the following were done:  Labs Reviewed [x]    Labs Ordered []    Radiology Reports Reviewed [x]    Radiology Ordered []    PCP Records Reviewed []    Referring Provider Records Reviewed []    ER Records Reviewed []    Hospital Records Reviewed []    History Obtained From Family []    Radiology Images Reviewed [x]    Other Reviewed []    Records Requested []      MOMO Rollins  [facility]  [address] [facility] [address]  [facility] [address] [phone]

## 2023-12-01 ENCOUNTER — TELEPHONE (OUTPATIENT)
Dept: NEUROLOGY | Facility: CLINIC | Age: 63
End: 2023-12-01
Payer: COMMERCIAL

## 2023-12-11 DIAGNOSIS — R20.2 LEFT LEG PARESTHESIAS: ICD-10-CM

## 2023-12-11 DIAGNOSIS — R20.2 PARESTHESIAS IN LEFT HAND: ICD-10-CM

## 2023-12-11 RX ORDER — CYANOCOBALAMIN 1000 UG/ML
INJECTION, SOLUTION INTRAMUSCULAR; SUBCUTANEOUS
Qty: 5 ML | OUTPATIENT
Start: 2023-12-11

## 2023-12-11 NOTE — TELEPHONE ENCOUNTER
Called pharmacy and they stated that he had been given enough to finish the titration.  If you would like for him to continue injections they need a new script with updated SIG of once monthly.

## 2023-12-11 NOTE — TELEPHONE ENCOUNTER
Rx Refill Note  Requested Prescriptions     Pending Prescriptions Disp Refills    cyanocobalamin 1000 MCG/ML injection [Pharmacy Med Name: CYANOCOBALAMIN 1,000 MCG/ML MDV] 5 mL      Sig: INJECT 1 ML EVERY 3 DAYS FOR 7 DAYS, THEN 1 ML ONCE WEEKLY FOR FOUR WEEKS, THEN 1 ML EVERY 30 DAYS FOR 6 MONTHS      Last filled:  Last office visit with prescribing clinician: 11/17/2023      Next office visit with prescribing clinician: 1/9/2024     HERNANDEZ POLLACK  12/11/23, 08:54 EST      Denied-finished titration--routing to provider

## 2024-01-05 ENCOUNTER — TELEPHONE (OUTPATIENT)
Dept: NEUROLOGY | Facility: CLINIC | Age: 64
End: 2024-01-05
Payer: COMMERCIAL

## 2024-01-05 NOTE — TELEPHONE ENCOUNTER
Caller: José Raza    Relationship: Self    Best call back number: [phone] (home)     What is the best time to reach you: ANYTIME    What was the call regarding: PATIENT WAS TOLD TO CALL BEFORE HIS APPT TO GIVE AN UPDATE. HE STATED THAT THERE HAS BEEN NO MAJOR CHANGES AND THAT IT HAS NOT GOTTEN WORSE EITHER.     HE SAID HE OPTED OUT OF HIS PT AT [facility] DUE TO THEY ONLY WANTED TO DO TRACTION AND HE IS ABLE TO DO THAT ALONG WITH ADJUSTMENTS AT HIS [facility] OFFICE. HE STATED THAT HE IS OUT OF ALIGNMENT ON EACH VISIT, SO HE GETS ADJUSTED AND DOES THE TRACTION. HE SAID THE NUMBNESS STAYS BUT IT HAS CALMED DOWN A LITTLE. IT JUST WON'T GO AWAY.     PLEASE REVIEW  THANK YOU

## 2024-01-08 NOTE — TELEPHONE ENCOUNTER
"No answer.    Relay     \"Leyla is glad to hear that the numbness is not worsening/maybe improved some. She will further discuss at your next appointment on 1-9-24.\"           Reminder:[id number]}        "

## 2024-01-09 ENCOUNTER — OFFICE VISIT (OUTPATIENT)
Dept: NEUROLOGY | Facility: CLINIC | Age: 64
End: 2024-01-09
Payer: COMMERCIAL

## 2024-01-09 VITALS
HEIGHT: 69 IN | SYSTOLIC BLOOD PRESSURE: 124 MMHG | DIASTOLIC BLOOD PRESSURE: 82 MMHG | WEIGHT: 199.96 LBS | HEART RATE: 74 BPM | OXYGEN SATURATION: 92 % | BODY MASS INDEX: 29.62 KG/M2

## 2024-01-09 DIAGNOSIS — M51.26 PROTRUSION OF LUMBAR INTERVERTEBRAL DISC: ICD-10-CM

## 2024-01-09 DIAGNOSIS — R20.2 LEFT LEG PARESTHESIAS: Primary | ICD-10-CM

## 2024-01-09 DIAGNOSIS — R90.82 WHITE MATTER ABNORMALITY ON MRI OF BRAIN: ICD-10-CM

## 2024-01-09 DIAGNOSIS — Z87.898 HISTORY OF PREDIABETES: ICD-10-CM

## 2024-01-09 PROCEDURE — 99214 OFFICE O/P EST MOD 30 MIN: CPT

## 2024-01-09 NOTE — PROGRESS NOTES
Neuro Office Visit      Encounter Date: 2024   Patient Name: José Raza  : 1960   MRN: [id number]   PCP:  Wesley Mayfield MD     Chief Complaint:    Chief Complaint   Patient presents with    Left leg paresthesias       History of Present Illness:José Raza is a 63 y.o. male who is here today in Neurology for  left leg numbness     Initial visit 10/17/2023:  José Raza is a 63 y.o. male who is here today in Neurology for ED follow up.     PMH of ocular migraines (looking through running water), cardiac ablation for SVT over 10 years ago, HLD, occasional acid reflux      Patient was seen at [facility] ED on 10/9/2023 for evaluation of possible blood clot.  He reported at that time that he became symptomatic 4 days prior.  At that time he began experiencing numbness and tingling to his left upper extremity.  Symptoms seem to improve spontaneously.  However the following day he began experiencing numbness and tingling to his left lower extremity.  He went to his local emergency department where work-up including head CT was negative and he was discharged home with outpatient follow-up.  He was referred for an outpatient brain MRI which had not been completed yet.  On 10/9/2023 he began experiencing a sensation of pain and swelling to his left calf and was advised to come the emergency department to be evaluated.  He denied any weakness of left arm or left leg.  No changes to speech pattern.  While in the ED he he underwent Doppler ultrasound which was negative for DVT.  He also underwent MRI brain without contrast which showed mild generalized parenchymal volume loss and changes of chronic small vessel ischemic disease, no acute intracranial abnormality.  He was discharged from ED in stable condition with plan to be evaluated by neurology in 1 week.     In clinic today he reports left arm and left leg numbness. He reports that he does go to the chiropractor for chronic  neck pain. The pain went away in his left calf, but the numbness has persisted in his left leg from the waist down to his toes, he feels like he is walking on a foreign object. No urinary or bowel incontinence, no saddle anesthesia. He reports that his left arm has a slight bit of numbness in his fingertips and that he does have history of numbness in bilateral arms previously. Denies any persistent low back pain. No weakness or arms or legs. No history of diabetes or heavy alcohol use.        Current visit 11/17/2023:  José Raza returns to neurology clinic for continued evaluation of left leg paresthesias. MRI brain performed on 10/9/2023 negative for any acute changes, there is mild generalized parenchymal volume loss. There are scattered foci of increased T2 signal within the subcortical and periventricular white matter bilaterally. MRI lumbar spine performed on 10/24/2023 showed central posterior disc extrusion at the L2/3 level with minimal central and lateral recess narrowing. Mild to moderate foraminal stenosis at this level. Mild multifactorial degenerative changes of the lumbar spine otherwise.This was reviewed with Dr. Shaw as well. 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 Hemoglobin A1c 6.20 - prediabetes, vitamin b12 was 338 for which he was started on B12 injections, methylmalonic acid was 178. His case was reviewed with Dr. Shaw and he was started on Lyrica 50 mg daily.      He notes continued numbness that ebbs and flows in his left leg, in his toes the numbness can sometimes shift between toes, whole leg continues to feel numb from hip down to toes. He notes tingling when he walks. He also feels like there is pressure in his leg. He does feel that the left leg is heavier than the  right. He has been taking Lyrica 50 mg nightly without much change - does feel that it has helped some with the aching. No true left leg weakness but he does feel that it is heavier. He denies any next day grogginess from the Lyrica. He also notes intermittent left arm numbness which is much less bothersome/persistent than his left leg numbness.      For the last 3 years he has taken Zinc 50 mg daily.      Current visit 1/9/2024:  José Raza returns to neurology clinic for continued evaluation of left leg paresthesias. Prior workup has included MRI brain performed on 10/9/2023 negative for any acute changes, there is mild generalized parenchymal volume loss. There are scattered foci of increased T2 signal within the subcortical and periventricular white matter bilaterally. MRI lumbar spine performed on 10/24/2023 showed central posterior disc extrusion at the L2/3 level with minimal central and lateral recess narrowing. Mild to moderate foraminal stenosis at this level. Mild multifactorial degenerative changes of the lumbar spine otherwise.This was reviewed with Dr. Shaw as well. 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 Hemoglobin A1c 6.20 - prediabetes, vitamin b12 was 338 for which he was started on B12 injections, methylmalonic acid was 178. His case was reviewed with Dr. Shaw and he was started on Lyrica 50 mg daily which he feels has helped some with left leg aching but has not had much impact on numbness. He was examined alongside Dr. Shaw at last visit and we discussed that he does have some white matter changes in the brain that aren't well explained, as he is overall healthy with minimal vascular risk factors - nonsmoker, prediabetes, takes  "low dose Atorvastatin 5 mg daily. We discussed that the next step to consider is a lumbar puncture with MS profile - at that time he he wanted to try PT first/continue Lyrica 50 mg. In clinic today he reports that PT was going to have significant OOP cost so he has instead been seeing chiropractor for traction and spinal adjustments. Left leg numbness fluctuates some. He went to chiropractor and has had 4 visits with some relief- he reports that his back has been out of alignment every visit, he is not having back pain, reports that after the chiropractor numbness is improved. Numbness does fluctuate some as well. He has a sensation of his low back going out of alignment frequently and getting traction. He continues to hike and stays active. No bowel or bladder dysfunction.        Subjective      Review of Systems   Constitutional: Negative.    HENT: Negative.     Eyes: Negative.    Respiratory: Negative.     Cardiovascular: Negative.    Gastrointestinal: Negative.    Endocrine: Negative.    Genitourinary: Negative.    Musculoskeletal:  Negative for back pain.   Allergic/Immunologic: Negative.    Neurological:  Positive for numbness.   Hematological: Negative.    Psychiatric/Behavioral: Negative.            Past Medical History: No past medical history on file.    Past Surgical History: No past surgical history on file.    Family History: No family history on file.    Social History:   Social History     Socioeconomic History    Marital status: Single   Tobacco Use    Smoking status: Never     Passive exposure: Never    Smokeless tobacco: Never   Vaping Use    Vaping Use: Never used       Medications:     Current Outpatient Medications:     atorvastatin (LIPITOR) 5 MG half tablet, Take  by mouth Daily. Unsure of mg, Disp: , Rfl:     Cyanocobalamin 1000 MCG/ML kit, Inject 1 mL as directed Every 30 (Thirty) Days for 180 days., Disp: 6 mL, Rfl: 0    Needle, Disp, 23G X 1\" misc, To be used for B12 injections - B12 " "injections to be done by primary care office. Please also dispense 12 of the 1 mL syringes., Disp: 12 each, Rfl: 0    omeprazole (priLOSEC) 20 MG capsule, Take 1 capsule by mouth Daily., Disp: , Rfl:     pregabalin (Lyrica) 50 MG capsule, Take 1 capsule by mouth Every Night for 90 days., Disp: 30 capsule, Rfl: 2    Allergies:   No Known Allergies      Objective     Objective:    /82   Pulse 74   Ht 175.3 cm (69\")   Wt 90.7 kg (199 lb 15.3 oz)   SpO2 92%   BMI 29.53 kg/m²   Body mass index is 29.53 kg/m².    Physical Exam  Vitals reviewed.   Constitutional:       Appearance: Normal appearance.   HENT:      Head: Normocephalic and atraumatic.      Mouth/Throat:      Mouth: Mucous membranes are moist.      Pharynx: Oropharynx is clear.   Pulmonary:      Effort: Pulmonary effort is normal. No respiratory distress.   Musculoskeletal:      Right lower leg: No edema.      Left lower leg: No edema.   Skin:     General: Skin is warm and dry.   Neurological:      Mental Status: He is alert.          Neurology Exam:    General apperance: NAD.     Mental status: Alert, awake and oriented to time place and person.    Fund of knowledge:  Normal.     Language and Speech: No aphasia or dysarthria.    Naming , Repitition and Comprehension:  Can name objects, repeat a sentence and follow commands. Speech is clear and fluent with good repetition, comprehension, and naming.    Cranial Nerves:   CN II: Visual fields are full. Intact. Pupils - PERRLA  CN III, IV and VI: Extraocular movements are intact. Normal saccades.   CN V: Facial sensation is intact.   CN VII: Muscles of facial expression reveal no asymmetry. Intact.   CN VIII: Hearing is intact.   CN IX and X: Palate elevates symmetrically. Intact  CN XI: Shoulder shrug is intact.   CN XII: Tongue is midline without evidence of atrophy or fasciculation.     Motor:  Right UE muscle strength 5/5. Normal tone.     Left UE muscle strength 5/5. Normal tone.      Right LE " muscle strength 5/5. Normal tone.     Left LE muscle strength 5/5. Normal tone.      Sensory: Normal light touch sensation bilaterally - reports that sensation improved after recent chiropractor treatment    DTRs: 2+ bilaterally in upper and lower extremities.    Coordination: Normal finger-to-nose, heel to shin B/L.    Gait: Normal. 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     MRI Lumbar Spine Without Contrast    Result Date: 10/24/2023  Impression: 1.Central posterior disc extrusion at the L2/3 level with minimal central and lateral recess narrowing. Mild to moderate foraminal stenosis at this level. 2.Mild multifactorial degenerative changes of the lumbar spine otherwise. Electronically Signed: Alejandro Thomas MD  10/24/2023 9:05 AM CDT  Workstation ID: [id number]    MRI Brain Without Contrast    Result Date: 10/9/2023  Impression: 1. Mild generalized parenchymal volume loss and changes of chronic small vessel ischemic disease. 2. No acute intracranial abnormality. Electronically Signed: Ramiro Shepherd MD  10/9/2023 6:14 PM EDT  Workstation ID: [id number]       Labs:       Assessment / Plan      Assessment/Plan:   Diagnoses and all orders for this visit:    1. Left leg paresthesias (Primary)    2. Protrusion of lumbar intervertebral disc    3. White matter abnormality on MRI of brain    4. History of prediabetes       José Raza returns to neurology clinic for continued evaluation of LLE paresthesias. Prior workup has included MRI brain performed on 10/9/2023 negative for any acute changes, there is mild generalized parenchymal volume loss. There are scattered foci of increased  T2 signal within the subcortical and periventricular white matter bilaterally. MRI lumbar spine performed on 10/24/2023 showed central posterior disc extrusion at the L2/3 level with minimal central and lateral recess narrowing. Mild to moderate foraminal stenosis at this level. Mild multifactorial degenerative changes of the lumbar spine otherwise.This was reviewed with Dr. Shaw as well. 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 Hemoglobin A1c 6.20 - prediabetes, vitamin b12 was 338 for which he was started on B12 injections, methylmalonic acid was 178. His case was reviewed with Dr. Shaw and he was started on Lyrica 50 mg daily which he feels has helped some with left leg aching but has not had much impact on numbness. He was examined alongside Dr. Shaw at last visit and we discussed that he does have some white matter changes in the brain that aren't well explained, as he is overall healthy with minimal vascular risk factors - nonsmoker, prediabetes, takes low dose Atorvastatin 5 mg daily. We discussed that the next step to consider is a lumbar puncture with MS profile - at that time he wanted to try PT first/continue Lyrica 50 mg. I again reviewed his case today with Dr. Shaw. Today in clinic he has noted improvement with chiropractor and would like to continue this along with Lyrica 50 mg daily, he is to contact clinic should he wish to proceed with LP for MS workup. Will plan to have patient follow up with Dr. Shaw next in clinic.       Patient Education:       Reviewed medications, potential side effects and signs and symptoms to report. Discussed risk versus benefits of treatment plan with patient and/or family-including medications, labs and  radiology that may be ordered. Addressed questions and concerns during visit. Patient and/or family verbalized understanding and agree with plan. Instructed to call the office with any questions and report to ER with any life-threatening symptoms.     Follow Up:   Return in about 3 months (around 4/9/2024).    I spent 30  minutes in the care of this patient. I personally spent 50 percent of this time counseling and discussing diagnostic testing, evaluation, treatment options, and management .       During this visit the following were done:  Labs Reviewed []    Labs Ordered []    Radiology Reports Reviewed [x]    Radiology Ordered []    PCP Records Reviewed []    Referring Provider Records Reviewed []    ER Records Reviewed []    Hospital Records Reviewed []    History Obtained From Family []    Radiology Images Reviewed [x]    Other Reviewed []    Records Requested []      MOMO Rollins  [facility]  [address] [facility] [address]  [facility] [address] [phone]

## 2024-02-14 DIAGNOSIS — M51.26 PROTRUSION OF LUMBAR INTERVERTEBRAL DISC: ICD-10-CM

## 2024-02-14 DIAGNOSIS — R20.2 LEFT LEG PARESTHESIAS: ICD-10-CM

## 2024-02-15 RX ORDER — PREGABALIN 50 MG/1
50 CAPSULE ORAL NIGHTLY
Qty: 30 CAPSULE | Refills: 3 | Status: SHIPPED | OUTPATIENT
Start: 2024-02-15

## 2024-04-19 DIAGNOSIS — R20.2 PARESTHESIAS IN LEFT HAND: ICD-10-CM

## 2024-04-19 DIAGNOSIS — R20.2 LEFT LEG PARESTHESIAS: ICD-10-CM

## 2024-04-19 RX ORDER — CYANOCOBALAMIN 1000 UG/ML
INJECTION, SOLUTION INTRAMUSCULAR; SUBCUTANEOUS
Qty: 5 ML | OUTPATIENT
Start: 2024-04-19

## 2024-04-19 NOTE — TELEPHONE ENCOUNTER
Rx Refill Note  Requested Prescriptions     Pending Prescriptions Disp Refills    cyanocobalamin 1000 MCG/ML injection [Pharmacy Med Name: CYANOCOBALAMIN 1,000 MCG/ML MDV] 5 mL      Sig: INJECT 1 ML EVERY 3 DAYS FOR 7 DAYS, THEN 1 ML ONCE WEEKLY FOR FOUR WEEKS, THEN 1 ML EVERY 30 DAYS FOR 6 MONTHS      Last filled:12/11/23 0 refills  Last office visit with prescribing clinician: 1/9/2024      Next office visit with prescribing clinician: Visit date not found     HERNANDEZ POLLACK  04/19/24, 10:30 EDT    Not appropriate

## 2024-04-23 ENCOUNTER — TELEPHONE (OUTPATIENT)
Dept: NEUROLOGY | Facility: CLINIC | Age: 64
End: 2024-04-23
Payer: COMMERCIAL

## 2024-06-10 ENCOUNTER — OFFICE VISIT (OUTPATIENT)
Dept: NEUROLOGY | Facility: CLINIC | Age: 64
End: 2024-06-10
Payer: COMMERCIAL

## 2024-06-10 VITALS
HEART RATE: 65 BPM | HEIGHT: 69 IN | BODY MASS INDEX: 29.51 KG/M2 | SYSTOLIC BLOOD PRESSURE: 134 MMHG | DIASTOLIC BLOOD PRESSURE: 88 MMHG | OXYGEN SATURATION: 95 %

## 2024-06-10 DIAGNOSIS — R20.2 LEFT LEG PARESTHESIAS: ICD-10-CM

## 2024-06-10 DIAGNOSIS — M51.26 PROTRUSION OF LUMBAR INTERVERTEBRAL DISC: ICD-10-CM

## 2024-06-10 PROCEDURE — 99214 OFFICE O/P EST MOD 30 MIN: CPT | Performed by: PSYCHIATRY & NEUROLOGY

## 2024-06-10 RX ORDER — PREGABALIN 50 MG/1
50 CAPSULE ORAL 3 TIMES DAILY
Qty: 90 CAPSULE | Refills: 4 | Status: SHIPPED | OUTPATIENT
Start: 2024-06-10 | End: 2024-07-10

## 2024-06-10 RX ORDER — SYRINGE, DISPOSABLE, 3 ML
2 SYRINGE, EMPTY DISPOSABLE MISCELLANEOUS
Qty: 2 EACH | Refills: 0 | Status: SHIPPED | OUTPATIENT
Start: 2024-06-10

## 2024-06-10 NOTE — PROGRESS NOTES
Subjective:    CC: José Raza is in clinic today for follow up for history of left leg paresthesias.    HPI:  Initial visit: 10/17/2023: José Raza is a 63 y.o. male who is here today in Neurology for ED follow up.     PMH of ocular migraines (looking through running water), cardiac ablation for SVT over 10 years ago, HLD, occasional acid reflux      Patient was seen at [facility] ED on 10/9/2023 for evaluation of possible blood clot.  He reported at that time that he became symptomatic 4 days prior.  At that time he began experiencing numbness and tingling to his left upper extremity.  Symptoms seem to improve spontaneously.  However the following day he began experiencing numbness and tingling to his left lower extremity.  He went to his local emergency department where work-up including head CT was negative and he was discharged home with outpatient follow-up.  He was referred for an outpatient brain MRI which had not been completed yet.  On 10/9/2023 he began experiencing a sensation of pain and swelling to his left calf and was advised to come the emergency department to be evaluated.  He denied any weakness of left arm or left leg.  No changes to speech pattern.  While in the ED he he underwent Doppler ultrasound which was negative for DVT.  He also underwent MRI brain without contrast which showed mild generalized parenchymal volume loss and changes of chronic small vessel ischemic disease, no acute intracranial abnormality.  He was discharged from ED in stable condition with plan to be evaluated by neurology in 1 week.     In clinic today he reports left arm and left leg numbness. He reports that he does go to the chiropractor for chronic neck pain. The pain went away in his left calf, but the numbness has persisted in his left leg from the waist down to his toes, he feels like he is walking on a foreign object. No urinary or bowel incontinence, no saddle anesthesia. He reports that  his left arm has a slight bit of numbness in his fingertips and that he does have history of numbness in bilateral arms previously. Denies any persistent low back pain. No weakness or arms or legs. No history of diabetes or heavy alcohol use.        Current visit 11/17/2023:  José Raza returns to neurology clinic for continued evaluation of left leg paresthesias. MRI brain performed on 10/9/2023 negative for any acute changes, there is mild generalized parenchymal volume loss. There are scattered foci of increased T2 signal within the subcortical and periventricular white matter bilaterally. MRI lumbar spine performed on 10/24/2023 showed central posterior disc extrusion at the L2/3 level with minimal central and lateral recess narrowing. Mild to moderate foraminal stenosis at this level. Mild multifactorial degenerative changes of the lumbar spine otherwise.This was reviewed with Dr. Shaw as well. 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 Hemoglobin A1c 6.20 - prediabetes, vitamin b12 was 338 for which he was started on B12 injections, methylmalonic acid was 178. His case was reviewed with Dr. Shaw and he was started on Lyrica 50 mg daily.      He notes continued numbness that ebbs and flows in his left leg, in his toes the numbness can sometimes shift between toes, whole leg continues to feel numb from hip down to toes. He notes tingling when he walks. He also feels like there is pressure in his leg. He does feel that the left leg is heavier than the right. He has been taking Lyrica 50 mg nightly without much change - does feel that it has helped some with the aching. No true left leg weakness but he does feel that it is heavier. He denies any next day grogginess from the Lyrica. He also notes  intermittent left arm numbness which is much less bothersome/persistent than his left leg numbness.      For the last 3 years he has taken Zinc 50 mg daily.      Current visit 1/9/2024:  José Raza returns to neurology clinic for continued evaluation of left leg paresthesias. Prior workup has included MRI brain performed on 10/9/2023 negative for any acute changes, there is mild generalized parenchymal volume loss. There are scattered foci of increased T2 signal within the subcortical and periventricular white matter bilaterally. MRI lumbar spine performed on 10/24/2023 showed central posterior disc extrusion at the L2/3 level with minimal central and lateral recess narrowing. Mild to moderate foraminal stenosis at this level. Mild multifactorial degenerative changes of the lumbar spine otherwise.This was reviewed with Dr. Shaw as well. 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 Hemoglobin A1c 6.20 - prediabetes, vitamin b12 was 338 for which he was started on B12 injections, methylmalonic acid was 178. His case was reviewed with Dr. Shaw and he was started on Lyrica 50 mg daily which he feels has helped some with left leg aching but has not had much impact on numbness. He was examined alongside Dr. Shaw at last visit and we discussed that he does have some white matter changes in the brain that aren't well explained, as he is overall healthy with minimal vascular risk factors - nonsmoker, prediabetes, takes low dose Atorvastatin 5 mg daily. We discussed that the next step to consider is a lumbar puncture with MS profile - at that time he he wanted to try PT first/continue Lyrica 50 mg. In clinic today he reports that PT was going to have significant  "OOP cost so he has instead been seeing chiropractor for traction and spinal adjustments. Left leg numbness fluctuates some. He went to chiropractor and has had 4 visits with some relief- he reports that his back has been out of alignment every visit, he is not having back pain, reports that after the chiropractor numbness is improved. Numbness does fluctuate some as well. He has a sensation of his low back going out of alignment frequently and getting traction. He continues to hike and stays active. No bowel or bladder dysfunction.       Follow-up:(Dr. Shaw): 6/10/2024: He is in clinic for regular follow-up.  Since his last visit in October 2023, he reports that overall symptoms of left leg tingling, numbness and pain remains unchanged.  He has been taking Lyrica 50 mg at bedtime but reports no further decrease in the symptoms.  He denies any side effects with Lyrica use.  He has had detailed neurological workup which was essentially unremarkable save for mild multilevel degenerative changes of the lumbar spine.    The following portions of the patient's history were reviewed and updated as of 06/10/2024: allergies, social history, and problem list.       Current Outpatient Medications:     atorvastatin (LIPITOR) 5 MG half tablet, Take  by mouth Daily. Unsure of mg, Disp: , Rfl:     Cyanocobalamin (VITAMIN B-12 IJ), Inject 1,000 mcg as directed Every 30 (Thirty) Days., Disp: , Rfl:     Needle, Disp, 23G X 1\" misc, To be used for B12 injections - B12 injections to be done by primary care office. Please also dispense 12 of the 1 mL syringes., Disp: 12 each, Rfl: 0    omeprazole (priLOSEC) 20 MG capsule, Take 1 capsule by mouth Daily., Disp: , Rfl:     pregabalin (LYRICA) 50 MG capsule, Take 1 capsule by mouth 3 (Three) Times a Day for 30 days., Disp: 90 capsule, Rfl: 4    Syringe, Disposable, (Syringe 2-3 ML) 3 ML misc, Use 2 mL Every 30 (Thirty) Days., Disp: 2 each, Rfl: 0   No past medical history on file.   No past " "surgical history on file.   No family history on file.     Review of Systems  Objective:    /88   Pulse 65   Ht 175.3 cm (69.02\")   SpO2 95%   BMI 29.51 kg/m²     Neurology Exam:  General apperance: NAD.     Mental status: Alert, awake and oriented to time place and person.    Language and Speech: No aphasia or dysarthria.    CN II to XII: Intact.    Opthalmoscopic Exam: No papilledema.    Motor:  Right UE muscle strength 5/5. Normal tone.     Left UE muscle strength 5/5. Normal tone.      Right LE muscle strength 5/5. Normal tone.     Left LE muscle strength 5/5. Normal tone.      Sensory: Normal light touch, vibration and pinprick sensation bilaterally.    DTRs: 2+ bilaterally.    Babinski: Negative bilaterally.    Co-ordination: Normal finger-to-nose, heel to shin B/L.    Rhomberg: Negative.    Gait: Normal.    Cardiovascular: Regular rate and rhythm without murmur, gallop or rub.    Assessment and Plan:  1. Left leg paresthesia  2. Protrusion of lumbar intervertebral disc  -He reports only change in symptoms and reports no worsening in symptoms.  He continues to have tingling, numbness and pain involving left leg intermittently throughout the day.  Since he does not have any side effects with Lyrica, I will increase it to 50 mg 3 times daily dosing next 2 to 3 weeks for better therapeutic effect.  I have advised him to call office with response in 3 to 4 weeks otherwise I will see him back in clinic in 3 to 4 months for follow-up.     - pregabalin (LYRICA) 50 MG capsule; Take 1 capsule by mouth 3 (Three) Times a Day for 30 days.  Dispense: 90 capsule; Refill: 4  I spent 30 minutes in patient care: Reviewing records prior to the visit, entering orders and documentation and spent more than ladd 50% of this time face-to-face in management, instructions and education regarding above mentioned diagnosis and also on counseling and discussing about taking medication regularly, possible side effects with " medication use, importance of good sleep hygiene, good hydration and regular exercise.    Return in about 4 months (around 10/10/2024).       Note to patient: The 21st Century Cures Act makes medical notes like these available to patients in the interest of transparency. However, be advised this is a medical document. It is intended as peer to peer communication. It is written in medical language and may contain abbreviations or verbiage that are unfamiliar. It may appear blunt or direct. Medical documents are intended to carry relevant information, facts as evident, and the clinical opinion of the physician.

## 2024-09-05 ENCOUNTER — TELEPHONE (OUTPATIENT)
Dept: NEUROLOGY | Facility: CLINIC | Age: 64
End: 2024-09-05
Payer: COMMERCIAL

## 2024-09-05 NOTE — TELEPHONE ENCOUNTER
Caller: José Raza    Relationship: Self    Best call back number: [phone]      Requested Prescriptions:   Requested Prescriptions     Pending Prescriptions Disp Refills    Syringe, Disposable, (Syringe 2-3 ML) 3 ML misc 2 each 0     Sig: Use 2 mL Every 30 (Thirty) Days.        Pharmacy where request should be sent: [facility] [id number] [address] AT [facility] & ([facility] - [phone] [facility] [phone] FX     Last office visit with prescribing clinician: 6/10/2024   Last telemedicine visit with prescribing clinician: Visit date not found   Next office visit with prescribing clinician: 12/16/2024     Additional details provided by patient: SHORT BY 1 NEEDLE FOR B12 INJECTIONS    Does the patient have less than a 3 day supply:  [x] Yes  [] No    Would you like a call back once the refill request has been completed: [] Yes [x] No    If the office needs to give you a call back, can they leave a voicemail: [] Yes [x] No    Joel Abdul Rep   09/05/24 09:52 EDT

## 2024-09-06 RX ORDER — SYRINGE, DISPOSABLE, 3 ML
2 SYRINGE, EMPTY DISPOSABLE MISCELLANEOUS
Qty: 2 EACH | Refills: 0 | Status: SHIPPED | OUTPATIENT
Start: 2024-09-06

## 2024-09-06 NOTE — TELEPHONE ENCOUNTER
Rx Refill Note  Requested Prescriptions     Pending Prescriptions Disp Refills    Syringe, Disposable, (Syringe 2-3 ML) 3 ML misc 2 each 0     Sig: Use 2 mL Every 30 (Thirty) Days.      Last filled: 6/10/24 #2 with 0 refill    Last office visit with prescribing clinician: 6/10/2024      Next office visit with prescribing clinician: 12/16/2024     Stoney Kahn MA  09/06/24, 16:45 EDT

## 2024-12-02 DIAGNOSIS — R20.2 LEFT LEG PARESTHESIAS: ICD-10-CM

## 2024-12-02 DIAGNOSIS — M51.26 PROTRUSION OF LUMBAR INTERVERTEBRAL DISC: ICD-10-CM

## 2024-12-03 RX ORDER — PREGABALIN 50 MG/1
50 CAPSULE ORAL 3 TIMES DAILY
Qty: 90 CAPSULE | Refills: 3 | Status: SHIPPED | OUTPATIENT
Start: 2024-12-03

## 2024-12-03 NOTE — TELEPHONE ENCOUNTER
Rx Refill Note  Requested Prescriptions     Pending Prescriptions Disp Refills    pregabalin (LYRICA) 50 MG capsule [Pharmacy Med Name: PREGABALIN 50 MG CAPSULE] 90 capsule      Sig: TAKE 1 CAPSULE BY MOUTH 3 TIMES A DAY      Last filled: 6/10/24 for 5 mos. Will need additional fill before upcoming appt this month.     Last office visit with prescribing clinician: 6/10/2024      Next office visit with prescribing clinician: 12/16/2024     Stoney Kahn MA  12/03/24, 08:40 EST

## 2024-12-16 ENCOUNTER — OFFICE VISIT (OUTPATIENT)
Dept: NEUROLOGY | Facility: CLINIC | Age: 64
End: 2024-12-16
Payer: COMMERCIAL

## 2024-12-16 VITALS
HEIGHT: 69 IN | OXYGEN SATURATION: 97 % | HEART RATE: 67 BPM | BODY MASS INDEX: 29.51 KG/M2 | SYSTOLIC BLOOD PRESSURE: 124 MMHG | DIASTOLIC BLOOD PRESSURE: 82 MMHG

## 2024-12-16 DIAGNOSIS — R20.2 LEFT LEG PARESTHESIAS: Primary | ICD-10-CM

## 2024-12-16 PROCEDURE — 99214 OFFICE O/P EST MOD 30 MIN: CPT | Performed by: PSYCHIATRY & NEUROLOGY

## 2024-12-16 RX ORDER — PREGABALIN 25 MG/1
25 CAPSULE ORAL 3 TIMES DAILY
Qty: 90 CAPSULE | Refills: 1 | Status: SHIPPED | OUTPATIENT
Start: 2024-12-16 | End: 2025-01-15

## 2024-12-16 NOTE — PROGRESS NOTES
Subjective:    CC: José Raza is in clinic today for follow up for left foot numbness.    HPI:  Initial visit: 10/17/2023: José Raza is a 63 y.o. male who is here today in Neurology for ED follow up.     PMH of ocular migraines (looking through running water), cardiac ablation for SVT over 10 years ago, HLD, occasional acid reflux      Patient was seen at [facility] ED on 10/9/2023 for evaluation of possible blood clot.  He reported at that time that he became symptomatic 4 days prior.  At that time he began experiencing numbness and tingling to his left upper extremity.  Symptoms seem to improve spontaneously.  However the following day he began experiencing numbness and tingling to his left lower extremity.  He went to his local emergency department where work-up including head CT was negative and he was discharged home with outpatient follow-up.  He was referred for an outpatient brain MRI which had not been completed yet.  On 10/9/2023 he began experiencing a sensation of pain and swelling to his left calf and was advised to come the emergency department to be evaluated.  He denied any weakness of left arm or left leg.  No changes to speech pattern.  While in the ED he he underwent Doppler ultrasound which was negative for DVT.  He also underwent MRI brain without contrast which showed mild generalized parenchymal volume loss and changes of chronic small vessel ischemic disease, no acute intracranial abnormality.  He was discharged from ED in stable condition with plan to be evaluated by neurology in 1 week.     In clinic today he reports left arm and left leg numbness. He reports that he does go to the chiropractor for chronic neck pain. The pain went away in his left calf, but the numbness has persisted in his left leg from the waist down to his toes, he feels like he is walking on a foreign object. No urinary or bowel incontinence, no saddle anesthesia. He reports that his left arm  has a slight bit of numbness in his fingertips and that he does have history of numbness in bilateral arms previously. Denies any persistent low back pain. No weakness or arms or legs. No history of diabetes or heavy alcohol use.        Current visit 11/17/2023:  José Raza returns to neurology clinic for continued evaluation of left leg paresthesias. MRI brain performed on 10/9/2023 negative for any acute changes, there is mild generalized parenchymal volume loss. There are scattered foci of increased T2 signal within the subcortical and periventricular white matter bilaterally. MRI lumbar spine performed on 10/24/2023 showed central posterior disc extrusion at the L2/3 level with minimal central and lateral recess narrowing. Mild to moderate foraminal stenosis at this level. Mild multifactorial degenerative changes of the lumbar spine otherwise.This was reviewed with Dr. Shaw as well. 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 Hemoglobin A1c 6.20 - prediabetes, vitamin b12 was 338 for which he was started on B12 injections, methylmalonic acid was 178. His case was reviewed with Dr. Shaw and he was started on Lyrica 50 mg daily.      He notes continued numbness that ebbs and flows in his left leg, in his toes the numbness can sometimes shift between toes, whole leg continues to feel numb from hip down to toes. He notes tingling when he walks. He also feels like there is pressure in his leg. He does feel that the left leg is heavier than the right. He has been taking Lyrica 50 mg nightly without much change - does feel that it has helped some with the aching. No true left leg weakness but he does feel that it is heavier. He denies any next day grogginess from the Lyrica. He also notes intermittent left  arm numbness which is much less bothersome/persistent than his left leg numbness.      For the last 3 years he has taken Zinc 50 mg daily.    Current visit 1/9/2024:  José Raza returns to neurology clinic for continued evaluation of left leg paresthesias. Prior workup has included MRI brain performed on 10/9/2023 negative for any acute changes, there is mild generalized parenchymal volume loss. There are scattered foci of increased T2 signal within the subcortical and periventricular white matter bilaterally. MRI lumbar spine performed on 10/24/2023 showed central posterior disc extrusion at the L2/3 level with minimal central and lateral recess narrowing. Mild to moderate foraminal stenosis at this level. Mild multifactorial degenerative changes of the lumbar spine otherwise.This was reviewed with Dr. Shaw as well. 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 Hemoglobin A1c 6.20 - prediabetes, vitamin b12 was 338 for which he was started on B12 injections, methylmalonic acid was 178. His case was reviewed with Dr. Shaw and he was started on Lyrica 50 mg daily which he feels has helped some with left leg aching but has not had much impact on numbness. He was examined alongside Dr. Shaw at last visit and we discussed that he does have some white matter changes in the brain that aren't well explained, as he is overall healthy with minimal vascular risk factors - nonsmoker, prediabetes, takes low dose Atorvastatin 5 mg daily. We discussed that the next step to consider is a lumbar puncture with MS profile - at that time he he wanted to try PT first/continue Lyrica 50 mg. In clinic today he reports that PT was going to have significant OOP cost so he has  "instead been seeing chiropractor for traction and spinal adjustments. Left leg numbness fluctuates some. He went to chiropractor and has had 4 visits with some relief- he reports that his back has been out of alignment every visit, he is not having back pain, reports that after the chiropractor numbness is improved. Numbness does fluctuate some as well. He has a sensation of his low back going out of alignment frequently and getting traction. He continues to hike and stays active. No bowel or bladder dysfunction.     Follow-up:(Dr. Shaw): 6/10/2024: He is in clinic for regular follow-up.  Since his last visit in October 2023, he reports that overall symptoms of left leg tingling, numbness and pain remains unchanged.  He has been taking Lyrica 50 mg at bedtime but reports no further decrease in the symptoms.  He denies any side effects with Lyrica use.  He has had detailed neurological workup which was essentially unremarkable save for mild multilevel degenerative changes of the lumbar spine.    Follow-up: 12/16/2024: He is in clinic for regular follow-up.  He reports that since his last visit, he continues to have primarily numbness involving the left foot.  He has been taking Lyrica 50 mg 3 times daily but reports no change in his symptoms.  He denies any side effects with Lyrica use.    The following portions of the patient's history were reviewed and updated as of 12/16/2024: allergies, social history, and problem list.       Current Outpatient Medications:     atorvastatin (LIPITOR) 5 MG half tablet, Take  by mouth Daily. Unsure of mg, Disp: , Rfl:     Cyanocobalamin (VITAMIN B-12 IJ), Inject 1,000 mcg as directed Every 30 (Thirty) Days., Disp: , Rfl:     Needle, Disp, 23G X 1\" misc, To be used for B12 injections - B12 injections to be done by primary care office. Please also dispense 12 of the 1 mL syringes., Disp: 12 each, Rfl: 0    omeprazole (priLOSEC) 20 MG capsule, Take 1 capsule by mouth Daily., Disp: , " "Rfl:     pregabalin (LYRICA) 50 MG capsule, TAKE 1 CAPSULE BY MOUTH 3 TIMES A DAY, Disp: 90 capsule, Rfl: 3    Syringe, Disposable, (Syringe 2-3 ML) 3 ML misc, Use 2 mL Every 30 (Thirty) Days., Disp: 2 each, Rfl: 0    pregabalin (Lyrica) 25 MG capsule, Take 1 capsule by mouth 3 (Three) Times a Day for 30 days. Take 25 mg capsule with 50 mg capsule to make it 75 mg dose., Disp: 90 capsule, Rfl: 1   No past medical history on file.   No past surgical history on file.   No family history on file.     Review of Systems  Objective:    /82   Pulse 67   Ht 175.3 cm (69.02\")   SpO2 97%   BMI 29.51 kg/m²     Neurology Exam:  General apperance: NAD.     Mental status: Alert, awake and oriented to time place and person.    Language and Speech: No aphasia or dysarthria.    CN II to XII: Intact.    Opthalmoscopic Exam: No papilledema.    Motor:  Right UE muscle strength 5/5. Normal tone.     Left UE muscle strength 5/5. Normal tone.      Right LE muscle strength 5/5. Normal tone.     Left LE muscle strength 5/5. Normal tone.      Sensory: Normal light touch, vibration and pinprick sensation bilaterally.    DTRs: 2+ bilaterally.    Babinski: Negative bilaterally.    Co-ordination: Normal finger-to-nose, heel to shin B/L.    Rhomberg: Negative.    Gait: Normal.    Cardiovascular: Regular rate and rhythm without murmur, gallop or rub.    Assessment and Plan:  1. Left leg paresthesias  He has been taking Lyrica 50 mg 3 times daily but reports no change in left foot numbness.  Since he does not have any side effects with Lyrica use, it will be increased to 75 mg 3 times daily dose for better therapeutic effect.  I have advised him to call office with response in 4 to 6 weeks and if no response then my plan would be to take him off of Lyrica completely.    - pregabalin (Lyrica) 25 MG capsule; Take 1 capsule by mouth 3 (Three) Times a Day for 30 days. Take 25 mg capsule with 50 mg capsule to make it 75 mg dose.  Dispense: 90 " capsule; Refill: 1       I spent 30 minutes in patient care: Reviewing records prior to the visit, entering orders and documentation and spent more than ladd 50% of this time face-to-face in management, instructions and education regarding above mentioned diagnosis and also on counseling and discussing about taking medication regularly, possible side effects with medication use, importance of good sleep hygiene, good hydration and regular exercise.    Return in about 6 months (around 6/16/2025).       Note to patient: The 21st Century Cures Act makes medical notes like these available to patients in the interest of transparency. However, be advised this is a medical document. It is intended as peer to peer communication. It is written in medical language and may contain abbreviations or verbiage that are unfamiliar. It may appear blunt or direct. Medical documents are intended to carry relevant information, facts as evident, and the clinical opinion of the physician.

## 2025-01-20 ENCOUNTER — TELEPHONE (OUTPATIENT)
Dept: NEUROLOGY | Facility: CLINIC | Age: 65
End: 2025-01-20

## 2025-01-20 NOTE — TELEPHONE ENCOUNTER
Caller: José Raza    Relationship: Self    Best call back number: [phone]    Which medication are you concerned about: LYRICA    Who prescribed you this medication: DR SARAH    When did you start taking this medication: 12/19 IS WHEN HE STARTED INCREASED DOSAGE    What are your concerns: PATIENT WAS TOLD TO REPORT BACK TO LET US KNOW HOW THE INCREASED DOSAGE OF LYRICA WAS HELPING. HE HAS NOT HAD ANY OBSERVABLE CHANGE IN RELIEF SINCE INCREASED DOSAGE. HE WAS A LITTLE MORE TIRED AT FIRST BUT THAT HAS RESOLVED.

## 2025-01-23 NOTE — TELEPHONE ENCOUNTER
Returned call to José, he will increase as directed. He is currently using one 25mg capsule and one 50mg capsule to give his 75mg dose, will send rx request to Dr. Shaw for updated dose.

## 2025-01-24 DIAGNOSIS — M51.26 PROTRUSION OF LUMBAR INTERVERTEBRAL DISC: ICD-10-CM

## 2025-01-24 DIAGNOSIS — R20.2 LEFT LEG PARESTHESIAS: ICD-10-CM

## 2025-01-24 RX ORDER — PREGABALIN 75 MG/1
CAPSULE ORAL
Qty: 120 CAPSULE | Refills: 3 | Status: SHIPPED | OUTPATIENT
Start: 2025-01-24

## 2025-02-03 ENCOUNTER — TELEPHONE (OUTPATIENT)
Dept: NEUROLOGY | Facility: CLINIC | Age: 65
End: 2025-02-03
Payer: COMMERCIAL

## 2025-02-03 NOTE — TELEPHONE ENCOUNTER
Caller: LEON    Relationship: DR SOCO JETER    Best call back number:   [phone]   [phone]    What is the best time to reach you:     Who are you requesting to speak with (clinical staff, provider,  specific staff member):   DR SARAH    Do you know the name of the person who called:     What was the call regarding:   PLEASE FAX OV NOTES FROM 12-16-25 TO THE NUMBER ABOVE.

## 2025-06-06 DIAGNOSIS — M51.26 PROTRUSION OF LUMBAR INTERVERTEBRAL DISC: ICD-10-CM

## 2025-06-06 DIAGNOSIS — R20.2 LEFT LEG PARESTHESIAS: ICD-10-CM

## 2025-06-06 RX ORDER — PREGABALIN 75 MG/1
CAPSULE ORAL
Qty: 120 CAPSULE | Refills: 0 | Status: SHIPPED | OUTPATIENT
Start: 2025-06-06

## 2025-06-06 NOTE — TELEPHONE ENCOUNTER
Rx Refill Note  Requested Prescriptions     Pending Prescriptions Disp Refills    pregabalin (LYRICA) 75 MG capsule [Pharmacy Med Name: PREGABALIN 75 MG CAPSULE] 120 capsule      Sig: TAKE 1 CAPSULE BY MOUTH EVERY MORNING, 1 AT NOON AND TWO CAPSULES EVERY NIGHT      Last filled: 1/24/25 for 4 mos total. Will need an additional refill before his upcoming appt    Last office visit with prescribing clinician: 12/16/2024      Next office visit with prescribing clinician: 6/16/2025     Stoney Kahn MA  06/06/25, 09:44 EDT

## 2025-06-16 ENCOUNTER — OFFICE VISIT (OUTPATIENT)
Dept: NEUROLOGY | Facility: CLINIC | Age: 65
End: 2025-06-16
Payer: COMMERCIAL

## 2025-06-16 VITALS
BODY MASS INDEX: 29.62 KG/M2 | HEIGHT: 69 IN | SYSTOLIC BLOOD PRESSURE: 146 MMHG | WEIGHT: 200 LBS | DIASTOLIC BLOOD PRESSURE: 94 MMHG

## 2025-06-16 DIAGNOSIS — M51.26 PROTRUSION OF LUMBAR INTERVERTEBRAL DISC: ICD-10-CM

## 2025-06-16 DIAGNOSIS — R20.2 LEFT LEG PARESTHESIAS: Primary | ICD-10-CM

## 2025-06-16 PROCEDURE — 99214 OFFICE O/P EST MOD 30 MIN: CPT | Performed by: PSYCHIATRY & NEUROLOGY

## 2025-06-16 RX ORDER — PREGABALIN 75 MG/1
CAPSULE ORAL
Qty: 120 CAPSULE | Refills: 5 | Status: SHIPPED | OUTPATIENT
Start: 2025-06-16

## 2025-06-16 RX ORDER — LANOLIN ALCOHOL/MO/W.PET/CERES
1000 CREAM (GRAM) TOPICAL DAILY
COMMUNITY

## 2025-06-16 NOTE — PROGRESS NOTES
Subjective:    CC: Joés Raza is in clinic today for follow up for left leg paresthesias.    HPI:  Initial visit: 10/17/2023: José Raza is a 63 y.o. male who is here today in Neurology for ED follow up.     PMH of ocular migraines (looking through running water), cardiac ablation for SVT over 10 years ago, HLD, occasional acid reflux      Patient was seen at [facility] ED on 10/9/2023 for evaluation of possible blood clot.  He reported at that time that he became symptomatic 4 days prior.  At that time he began experiencing numbness and tingling to his left upper extremity.  Symptoms seem to improve spontaneously.  However the following day he began experiencing numbness and tingling to his left lower extremity.  He went to his local emergency department where work-up including head CT was negative and he was discharged home with outpatient follow-up.  He was referred for an outpatient brain MRI which had not been completed yet.  On 10/9/2023 he began experiencing a sensation of pain and swelling to his left calf and was advised to come the emergency department to be evaluated.  He denied any weakness of left arm or left leg.  No changes to speech pattern.  While in the ED he he underwent Doppler ultrasound which was negative for DVT.  He also underwent MRI brain without contrast which showed mild generalized parenchymal volume loss and changes of chronic small vessel ischemic disease, no acute intracranial abnormality.  He was discharged from ED in stable condition with plan to be evaluated by neurology in 1 week.     In clinic today he reports left arm and left leg numbness. He reports that he does go to the chiropractor for chronic neck pain. The pain went away in his left calf, but the numbness has persisted in his left leg from the waist down to his toes, he feels like he is walking on a foreign object. No urinary or bowel incontinence, no saddle anesthesia. He reports that his left  arm has a slight bit of numbness in his fingertips and that he does have history of numbness in bilateral arms previously. Denies any persistent low back pain. No weakness or arms or legs. No history of diabetes or heavy alcohol use.        Current visit 11/17/2023:  José Raza returns to neurology clinic for continued evaluation of left leg paresthesias. MRI brain performed on 10/9/2023 negative for any acute changes, there is mild generalized parenchymal volume loss. There are scattered foci of increased T2 signal within the subcortical and periventricular white matter bilaterally. MRI lumbar spine performed on 10/24/2023 showed central posterior disc extrusion at the L2/3 level with minimal central and lateral recess narrowing. Mild to moderate foraminal stenosis at this level. Mild multifactorial degenerative changes of the lumbar spine otherwise.This was reviewed with Dr. Shaw as well. 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 Hemoglobin A1c 6.20 - prediabetes, vitamin b12 was 338 for which he was started on B12 injections, methylmalonic acid was 178. His case was reviewed with Dr. Shaw and he was started on Lyrica 50 mg daily.      He notes continued numbness that ebbs and flows in his left leg, in his toes the numbness can sometimes shift between toes, whole leg continues to feel numb from hip down to toes. He notes tingling when he walks. He also feels like there is pressure in his leg. He does feel that the left leg is heavier than the right. He has been taking Lyrica 50 mg nightly without much change - does feel that it has helped some with the aching. No true left leg weakness but he does feel that it is heavier. He denies any next day grogginess from the Lyrica. He also notes intermittent  left arm numbness which is much less bothersome/persistent than his left leg numbness.      For the last 3 years he has taken Zinc 50 mg daily.    Current visit 1/9/2024:  José Raza returns to neurology clinic for continued evaluation of left leg paresthesias. Prior workup has included MRI brain performed on 10/9/2023 negative for any acute changes, there is mild generalized parenchymal volume loss. There are scattered foci of increased T2 signal within the subcortical and periventricular white matter bilaterally. MRI lumbar spine performed on 10/24/2023 showed central posterior disc extrusion at the L2/3 level with minimal central and lateral recess narrowing. Mild to moderate foraminal stenosis at this level. Mild multifactorial degenerative changes of the lumbar spine otherwise.This was reviewed with Dr. Shaw as well. 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 Hemoglobin A1c 6.20 - prediabetes, vitamin b12 was 338 for which he was started on B12 injections, methylmalonic acid was 178. His case was reviewed with Dr. Shaw and he was started on Lyrica 50 mg daily which he feels has helped some with left leg aching but has not had much impact on numbness. He was examined alongside Dr. Shaw at last visit and we discussed that he does have some white matter changes in the brain that aren't well explained, as he is overall healthy with minimal vascular risk factors - nonsmoker, prediabetes, takes low dose Atorvastatin 5 mg daily. We discussed that the next step to consider is a lumbar puncture with MS profile - at that time he he wanted to try PT first/continue Lyrica 50 mg. In clinic today he reports that PT was going to have significant OOP cost so he  has instead been seeing chiropractor for traction and spinal adjustments. Left leg numbness fluctuates some. He went to chiropractor and has had 4 visits with some relief- he reports that his back has been out of alignment every visit, he is not having back pain, reports that after the chiropractor numbness is improved. Numbness does fluctuate some as well. He has a sensation of his low back going out of alignment frequently and getting traction. He continues to hike and stays active. No bowel or bladder dysfunction.     Follow-up:(Dr. Shaw): 6/10/2024: He is in clinic for regular follow-up.  Since his last visit in October 2023, he reports that overall symptoms of left leg tingling, numbness and pain remains unchanged.  He has been taking Lyrica 50 mg at bedtime but reports no further decrease in the symptoms.  He denies any side effects with Lyrica use.  He has had detailed neurological workup which was essentially unremarkable save for mild multilevel degenerative changes of the lumbar spine.    Follow-up: 12/16/2024: He is in clinic for regular follow-up.  He reports that since his last visit, he continues to have primarily numbness involving the left foot.  He has been taking Lyrica 50 mg 3 times daily but reports no change in his symptoms.  He denies any side effects with Lyrica use.      Follow-up: 6/16/2025: He is in clinic for regular follow-up.  Since his last visit 6 months ago, he is now taking Lyrica 75 mg in the morning, 75 mg at noon and 150 mg at night.  He reports no worsening in left leg and left foot tingling and numbness.  He denies any side effects with Lyrica use.      The following portions of the patient's history were reviewed and updated as of 06/16/2025: allergies, social history, and problem list.       Current Outpatient Medications:     Atorvastatin Calcium (LIPITOR PO), Take  by mouth Daily. Pt unsure of mg, Disp: , Rfl:     omeprazole (priLOSEC) 20 MG capsule, Take 1 capsule by mouth  "Daily., Disp: , Rfl:     pregabalin (LYRICA) 75 MG capsule, TAKE 1 CAPSULE BY MOUTH EVERY MORNING, 1 AT NOON AND TWO CAPSULES EVERY NIGHT, Disp: 120 capsule, Rfl: 5    Syringe, Disposable, (Syringe 2-3 ML) 3 ML misc, Use 2 mL Every 30 (Thirty) Days., Disp: 2 each, Rfl: 0    vitamin B-12 (CYANOCOBALAMIN) 1000 MCG tablet, Take 1 tablet by mouth Daily., Disp: , Rfl:    No past medical history on file.   No past surgical history on file.   No family history on file.     Review of Systems  Objective:    /94   Ht 175.3 cm (69.02\") Comment: self reported  Wt 90.7 kg (200 lb) Comment: self reported  BMI 29.52 kg/m²     Neurology Exam:  General apperance: NAD.     Mental status: Alert, awake and oriented to time place and person.    Language and Speech: No aphasia or dysarthria.    CN II to XII: Intact.    Opthalmoscopic Exam: No papilledema.    Motor:  Right UE muscle strength 5/5. Normal tone.     Left UE muscle strength 5/5. Normal tone.      Right LE muscle strength 5/5. Normal tone.     Left LE muscle strength 5/5. Normal tone.      Sensory: Normal light touch, vibration and pinprick sensation bilaterally.    DTRs: 2+ bilaterally.    Babinski: Negative bilaterally.    Co-ordination: Normal finger-to-nose, heel to shin B/L.    Rhomberg: Negative.    Gait: Normal.    Cardiovascular: Regular rate and rhythm without murmur, gallop or rub.    Assessment and Plan:  1. Left leg paresthesias  -Overall stable without worsening which is reassuring.  Continue with Lyrica 75 mg twice daily and 150 mg at night to help with left leg paresthesias.  Otherwise I will see him back in clinic in 6 months for follow-up.    I spent 30 minutes in patient care: Reviewing records prior to the visit, entering orders and documentation and spent more than ladd 50% of this time face-to-face in management, instructions and education regarding above mentioned diagnosis and also on counseling and discussing about taking medication regularly, " possible side effects with medication use, importance of good sleep hygiene, good hydration and regular exercise.    Return in about 6 months (around 12/16/2025).       Note to patient: The 21st Century Cures Act makes medical notes like these available to patients in the interest of transparency. However, be advised this is a medical document. It is intended as peer to peer communication. It is written in medical language and may contain abbreviations or verbiage that are unfamiliar. It may appear blunt or direct. Medical documents are intended to carry relevant information, facts as evident, and the clinical opinion of the physician.